# Patient Record
Sex: MALE | Race: WHITE | NOT HISPANIC OR LATINO | Employment: FULL TIME | URBAN - METROPOLITAN AREA
[De-identification: names, ages, dates, MRNs, and addresses within clinical notes are randomized per-mention and may not be internally consistent; named-entity substitution may affect disease eponyms.]

---

## 2018-12-08 ENCOUNTER — HOSPITAL ENCOUNTER (OUTPATIENT)
Facility: HOSPITAL | Age: 28
Setting detail: OBSERVATION
Discharge: LEFT BEFORE DISCHARGE | End: 2018-12-09
Attending: EMERGENCY MEDICINE | Admitting: EMERGENCY MEDICINE

## 2018-12-08 ENCOUNTER — APPOINTMENT (OUTPATIENT)
Dept: CT IMAGING | Facility: HOSPITAL | Age: 28
End: 2018-12-08

## 2018-12-08 DIAGNOSIS — B99.9 FEVER DUE TO INFECTION: ICD-10-CM

## 2018-12-08 DIAGNOSIS — K61.1 PERIRECTAL ABSCESS: Primary | ICD-10-CM

## 2018-12-08 DIAGNOSIS — A41.9 SEPSIS, DUE TO UNSPECIFIED ORGANISM: ICD-10-CM

## 2018-12-08 LAB
ALBUMIN SERPL-MCNC: 4 G/DL (ref 3.5–5.2)
ALBUMIN/GLOB SERPL: 1.8 G/DL
ALP SERPL-CCNC: 63 U/L (ref 39–117)
ALT SERPL W P-5'-P-CCNC: 13 U/L (ref 1–41)
ANION GAP SERPL CALCULATED.3IONS-SCNC: 9.9 MMOL/L
AST SERPL-CCNC: 13 U/L (ref 1–40)
BASOPHILS # BLD AUTO: 0.05 10*3/MM3 (ref 0–0.2)
BASOPHILS NFR BLD AUTO: 0.3 % (ref 0–1.5)
BILIRUB SERPL-MCNC: 0.3 MG/DL (ref 0.1–1.2)
BUN BLD-MCNC: 5 MG/DL (ref 6–20)
BUN/CREAT SERPL: 7.4 (ref 7–25)
CALCIUM SPEC-SCNC: 9 MG/DL (ref 8.6–10.5)
CHLORIDE SERPL-SCNC: 102 MMOL/L (ref 98–107)
CO2 SERPL-SCNC: 28.1 MMOL/L (ref 22–29)
CREAT BLD-MCNC: 0.68 MG/DL (ref 0.76–1.27)
D-LACTATE SERPL-SCNC: 1.2 MMOL/L (ref 0.5–2)
DEPRECATED RDW RBC AUTO: 48 FL (ref 37–54)
EOSINOPHIL # BLD AUTO: 0.18 10*3/MM3 (ref 0–0.7)
EOSINOPHIL NFR BLD AUTO: 1.2 % (ref 0.3–6.2)
ERYTHROCYTE [DISTWIDTH] IN BLOOD BY AUTOMATED COUNT: 13.1 % (ref 11.5–14.5)
GFR SERPL CREATININE-BSD FRML MDRD: 140 ML/MIN/1.73
GLOBULIN UR ELPH-MCNC: 2.2 GM/DL
GLUCOSE BLD-MCNC: 126 MG/DL (ref 65–99)
HCT VFR BLD AUTO: 41.3 % (ref 40.4–52.2)
HGB BLD-MCNC: 13.3 G/DL (ref 13.7–17.6)
HOLD SPECIMEN: NORMAL
HOLD SPECIMEN: NORMAL
IMM GRANULOCYTES # BLD: 0.03 10*3/MM3 (ref 0–0.03)
IMM GRANULOCYTES NFR BLD: 0.2 % (ref 0–0.5)
LIPASE SERPL-CCNC: 13 U/L (ref 13–60)
LYMPHOCYTES # BLD AUTO: 2.41 10*3/MM3 (ref 0.9–4.8)
LYMPHOCYTES NFR BLD AUTO: 16.3 % (ref 19.6–45.3)
MCH RBC QN AUTO: 32 PG (ref 27–32.7)
MCHC RBC AUTO-ENTMCNC: 32.2 G/DL (ref 32.6–36.4)
MCV RBC AUTO: 99.3 FL (ref 79.8–96.2)
MONOCYTES # BLD AUTO: 1.29 10*3/MM3 (ref 0.2–1.2)
MONOCYTES NFR BLD AUTO: 8.7 % (ref 5–12)
NEUTROPHILS # BLD AUTO: 10.84 10*3/MM3 (ref 1.9–8.1)
NEUTROPHILS NFR BLD AUTO: 73.3 % (ref 42.7–76)
PLATELET # BLD AUTO: 161 10*3/MM3 (ref 140–500)
PMV BLD AUTO: 11.8 FL (ref 6–12)
POTASSIUM BLD-SCNC: 3.7 MMOL/L (ref 3.5–5.2)
PROT SERPL-MCNC: 6.2 G/DL (ref 6–8.5)
RBC # BLD AUTO: 4.16 10*6/MM3 (ref 4.6–6)
SODIUM BLD-SCNC: 140 MMOL/L (ref 136–145)
WBC NRBC COR # BLD: 14.8 10*3/MM3 (ref 4.5–10.7)
WHOLE BLOOD HOLD SPECIMEN: NORMAL
WHOLE BLOOD HOLD SPECIMEN: NORMAL

## 2018-12-08 PROCEDURE — 25010000002 METHYLPREDNISOLONE PER 125 MG: Performed by: EMERGENCY MEDICINE

## 2018-12-08 PROCEDURE — 25010000002 ONDANSETRON PER 1 MG: Performed by: EMERGENCY MEDICINE

## 2018-12-08 PROCEDURE — 96375 TX/PRO/DX INJ NEW DRUG ADDON: CPT

## 2018-12-08 PROCEDURE — 87040 BLOOD CULTURE FOR BACTERIA: CPT | Performed by: EMERGENCY MEDICINE

## 2018-12-08 PROCEDURE — 83690 ASSAY OF LIPASE: CPT | Performed by: EMERGENCY MEDICINE

## 2018-12-08 PROCEDURE — 96361 HYDRATE IV INFUSION ADD-ON: CPT

## 2018-12-08 PROCEDURE — 80053 COMPREHEN METABOLIC PANEL: CPT | Performed by: EMERGENCY MEDICINE

## 2018-12-08 PROCEDURE — 99284 EMERGENCY DEPT VISIT MOD MDM: CPT

## 2018-12-08 PROCEDURE — G0378 HOSPITAL OBSERVATION PER HR: HCPCS

## 2018-12-08 PROCEDURE — 85025 COMPLETE CBC W/AUTO DIFF WBC: CPT | Performed by: EMERGENCY MEDICINE

## 2018-12-08 PROCEDURE — 83605 ASSAY OF LACTIC ACID: CPT | Performed by: EMERGENCY MEDICINE

## 2018-12-08 PROCEDURE — 25010000002 HYDROMORPHONE PER 4 MG: Performed by: EMERGENCY MEDICINE

## 2018-12-08 PROCEDURE — 36415 COLL VENOUS BLD VENIPUNCTURE: CPT

## 2018-12-08 PROCEDURE — 25010000002 DIPHENHYDRAMINE PER 50 MG: Performed by: EMERGENCY MEDICINE

## 2018-12-08 RX ORDER — SODIUM CHLORIDE 0.9 % (FLUSH) 0.9 %
10 SYRINGE (ML) INJECTION AS NEEDED
Status: DISCONTINUED | OUTPATIENT
Start: 2018-12-08 | End: 2018-12-09 | Stop reason: HOSPADM

## 2018-12-08 RX ORDER — DIPHENHYDRAMINE HYDROCHLORIDE 50 MG/ML
25 INJECTION INTRAMUSCULAR; INTRAVENOUS ONCE
Status: COMPLETED | OUTPATIENT
Start: 2018-12-08 | End: 2018-12-08

## 2018-12-08 RX ORDER — ACETAMINOPHEN 500 MG
1000 TABLET ORAL ONCE
Status: DISCONTINUED | OUTPATIENT
Start: 2018-12-08 | End: 2018-12-09 | Stop reason: HOSPADM

## 2018-12-08 RX ORDER — ONDANSETRON 2 MG/ML
4 INJECTION INTRAMUSCULAR; INTRAVENOUS ONCE
Status: COMPLETED | OUTPATIENT
Start: 2018-12-08 | End: 2018-12-08

## 2018-12-08 RX ORDER — METHYLPREDNISOLONE SODIUM SUCCINATE 125 MG/2ML
125 INJECTION, POWDER, LYOPHILIZED, FOR SOLUTION INTRAMUSCULAR; INTRAVENOUS ONCE
Status: COMPLETED | OUTPATIENT
Start: 2018-12-08 | End: 2018-12-08

## 2018-12-08 RX ORDER — HYDROMORPHONE HYDROCHLORIDE 1 MG/ML
0.5 INJECTION, SOLUTION INTRAMUSCULAR; INTRAVENOUS; SUBCUTANEOUS ONCE
Status: COMPLETED | OUTPATIENT
Start: 2018-12-08 | End: 2018-12-08

## 2018-12-08 RX ADMIN — HYDROMORPHONE HYDROCHLORIDE 0.5 MG: 1 INJECTION, SOLUTION INTRAMUSCULAR; INTRAVENOUS; SUBCUTANEOUS at 22:48

## 2018-12-08 RX ADMIN — METHYLPREDNISOLONE SODIUM SUCCINATE 125 MG: 125 INJECTION, POWDER, FOR SOLUTION INTRAMUSCULAR; INTRAVENOUS at 23:57

## 2018-12-08 RX ADMIN — ONDANSETRON 4 MG: 2 INJECTION INTRAMUSCULAR; INTRAVENOUS at 22:49

## 2018-12-08 RX ADMIN — DIPHENHYDRAMINE HYDROCHLORIDE 25 MG: 50 INJECTION, SOLUTION INTRAMUSCULAR; INTRAVENOUS at 23:57

## 2018-12-08 RX ADMIN — SODIUM CHLORIDE, POTASSIUM CHLORIDE, SODIUM LACTATE AND CALCIUM CHLORIDE 1000 ML: 600; 310; 30; 20 INJECTION, SOLUTION INTRAVENOUS at 22:45

## 2018-12-09 ENCOUNTER — APPOINTMENT (OUTPATIENT)
Dept: CT IMAGING | Facility: HOSPITAL | Age: 28
End: 2018-12-09

## 2018-12-09 VITALS
RESPIRATION RATE: 18 BRPM | HEIGHT: 75 IN | OXYGEN SATURATION: 96 % | DIASTOLIC BLOOD PRESSURE: 72 MMHG | SYSTOLIC BLOOD PRESSURE: 122 MMHG | TEMPERATURE: 98.6 F | BODY MASS INDEX: 23.08 KG/M2 | WEIGHT: 185.6 LBS | HEART RATE: 82 BPM

## 2018-12-09 PROBLEM — K61.1 PERIRECTAL ABSCESS: Status: ACTIVE | Noted: 2018-12-09

## 2018-12-09 LAB
BACTERIA UR QL AUTO: ABNORMAL /HPF
BILIRUB UR QL STRIP: NEGATIVE
CLARITY UR: CLEAR
COLOR UR: YELLOW
GLUCOSE UR STRIP-MCNC: NEGATIVE MG/DL
HGB UR QL STRIP.AUTO: ABNORMAL
HYALINE CASTS UR QL AUTO: ABNORMAL /LPF
KETONES UR QL STRIP: NEGATIVE
LEUKOCYTE ESTERASE UR QL STRIP.AUTO: NEGATIVE
NITRITE UR QL STRIP: NEGATIVE
PH UR STRIP.AUTO: >=9 [PH] (ref 5–8)
PROT UR QL STRIP: NEGATIVE
RBC # UR: ABNORMAL /HPF
REF LAB TEST METHOD: ABNORMAL
SP GR UR STRIP: 1.01 (ref 1–1.03)
SQUAMOUS #/AREA URNS HPF: ABNORMAL /HPF
UROBILINOGEN UR QL STRIP: ABNORMAL
WBC UR QL AUTO: ABNORMAL /HPF

## 2018-12-09 PROCEDURE — 25010000002 PIPERACILLIN SOD-TAZOBACTAM PER 1 G: Performed by: EMERGENCY MEDICINE

## 2018-12-09 PROCEDURE — 96376 TX/PRO/DX INJ SAME DRUG ADON: CPT

## 2018-12-09 PROCEDURE — 25010000002 IOPAMIDOL 61 % SOLUTION: Performed by: EMERGENCY MEDICINE

## 2018-12-09 PROCEDURE — 25010000002 PIPERACILLIN SOD-TAZOBACTAM PER 1 G: Performed by: SURGERY

## 2018-12-09 PROCEDURE — 74177 CT ABD & PELVIS W/CONTRAST: CPT

## 2018-12-09 PROCEDURE — G0378 HOSPITAL OBSERVATION PER HR: HCPCS

## 2018-12-09 PROCEDURE — 96366 THER/PROPH/DIAG IV INF ADDON: CPT

## 2018-12-09 PROCEDURE — 81001 URINALYSIS AUTO W/SCOPE: CPT | Performed by: EMERGENCY MEDICINE

## 2018-12-09 PROCEDURE — 96361 HYDRATE IV INFUSION ADD-ON: CPT

## 2018-12-09 PROCEDURE — 96365 THER/PROPH/DIAG IV INF INIT: CPT

## 2018-12-09 PROCEDURE — 87040 BLOOD CULTURE FOR BACTERIA: CPT | Performed by: EMERGENCY MEDICINE

## 2018-12-09 PROCEDURE — 25010000002 HYDROMORPHONE PER 4 MG: Performed by: EMERGENCY MEDICINE

## 2018-12-09 PROCEDURE — 25010000002 HYDROMORPHONE PER 4 MG: Performed by: SURGERY

## 2018-12-09 PROCEDURE — 99218 PR INITIAL OBSERVATION CARE/DAY 30 MINUTES: CPT | Performed by: SURGERY

## 2018-12-09 RX ORDER — HYDROMORPHONE HYDROCHLORIDE 1 MG/ML
0.5 INJECTION, SOLUTION INTRAMUSCULAR; INTRAVENOUS; SUBCUTANEOUS
Status: DISCONTINUED | OUTPATIENT
Start: 2018-12-09 | End: 2018-12-09 | Stop reason: HOSPADM

## 2018-12-09 RX ORDER — SODIUM CHLORIDE, SODIUM LACTATE, POTASSIUM CHLORIDE, CALCIUM CHLORIDE 600; 310; 30; 20 MG/100ML; MG/100ML; MG/100ML; MG/100ML
100 INJECTION, SOLUTION INTRAVENOUS CONTINUOUS
Status: DISCONTINUED | OUTPATIENT
Start: 2018-12-09 | End: 2018-12-09 | Stop reason: HOSPADM

## 2018-12-09 RX ORDER — ONDANSETRON 2 MG/ML
4 INJECTION INTRAMUSCULAR; INTRAVENOUS EVERY 6 HOURS PRN
Status: DISCONTINUED | OUTPATIENT
Start: 2018-12-09 | End: 2018-12-09 | Stop reason: HOSPADM

## 2018-12-09 RX ORDER — HYDROMORPHONE HYDROCHLORIDE 1 MG/ML
0.5 INJECTION, SOLUTION INTRAMUSCULAR; INTRAVENOUS; SUBCUTANEOUS ONCE
Status: COMPLETED | OUTPATIENT
Start: 2018-12-09 | End: 2018-12-09

## 2018-12-09 RX ADMIN — HYDROMORPHONE HYDROCHLORIDE 0.5 MG: 1 INJECTION, SOLUTION INTRAMUSCULAR; INTRAVENOUS; SUBCUTANEOUS at 01:52

## 2018-12-09 RX ADMIN — SODIUM CHLORIDE, POTASSIUM CHLORIDE, SODIUM LACTATE AND CALCIUM CHLORIDE 100 ML/HR: 600; 310; 30; 20 INJECTION, SOLUTION INTRAVENOUS at 03:28

## 2018-12-09 RX ADMIN — TAZOBACTAM SODIUM AND PIPERACILLIN SODIUM 3.38 G: 375; 3 INJECTION, SOLUTION INTRAVENOUS at 09:05

## 2018-12-09 RX ADMIN — IOPAMIDOL 85 ML: 612 INJECTION, SOLUTION INTRAVENOUS at 00:58

## 2018-12-09 RX ADMIN — HYDROMORPHONE HYDROCHLORIDE 0.5 MG: 1 INJECTION, SOLUTION INTRAMUSCULAR; INTRAVENOUS; SUBCUTANEOUS at 03:28

## 2018-12-09 RX ADMIN — TAZOBACTAM SODIUM AND PIPERACILLIN SODIUM 3.38 G: 375; 3 INJECTION, SOLUTION INTRAVENOUS at 01:44

## 2018-12-09 RX ADMIN — HYDROMORPHONE HYDROCHLORIDE 0.5 MG: 1 INJECTION, SOLUTION INTRAMUSCULAR; INTRAVENOUS; SUBCUTANEOUS at 09:05

## 2018-12-09 NOTE — ED PROVIDER NOTES
EMERGENCY DEPARTMENT ENCOUNTER    CHIEF COMPLAINT  Chief Complaint: Constipation  History given by: Pt  History limited by: None  Room Number: 19/19  PMD: Provider, No Known      HPI:  Pt is a 28 y.o. male who presents complaining of worsening constipation, rectal pain, and lower abd pain that began 3 days ago. Pt states his last BM was 1 week ago. Pt states he feel the pressure in his rectum but is unable to pass any stool. Pt states he is passing gas but with a lot of rectal pressure. Pt has a fever upon arrival to ED. Pt denies nausea, vomiting, cough, sore throat, and urinary sx.  Pt has tried stool softer and laxatives without relief. Pt has h/o appendectomy.     Duration:  1 week  Onset: gradual  Timing: constant  Location: rectal  Quality: feels stool on rectum but cannot pass it   Intensity/Severity: moderate  Progression: worsening   Associated Symptoms: rectal pain, lower abd pain, fever  Aggravating Factors: none  Alleviating Factors: none  Previous Episodes: none stated   Treatment before arrival: Pt has tried stool softer and laxatives without relief.    PAST MEDICAL HISTORY  Active Ambulatory Problems     Diagnosis Date Noted   • No Active Ambulatory Problems     Resolved Ambulatory Problems     Diagnosis Date Noted   • No Resolved Ambulatory Problems     No Additional Past Medical History       PAST SURGICAL HISTORY  Past Surgical History:   Procedure Laterality Date   • ADENOIDECTOMY     • APPENDECTOMY     • CLEFT LIP REPAIR     • CLEFT PALATE REPAIR     • TONSILLECTOMY         FAMILY HISTORY  History reviewed. No pertinent family history.    SOCIAL HISTORY  Social History     Socioeconomic History   • Marital status:      Spouse name: Not on file   • Number of children: Not on file   • Years of education: Not on file   • Highest education level: Not on file   Social Needs   • Financial resource strain: Not on file   • Food insecurity - worry: Not on file   • Food insecurity - inability: Not  on file   • Transportation needs - medical: Not on file   • Transportation needs - non-medical: Not on file   Occupational History   • Not on file   Tobacco Use   • Smoking status: Current Every Day Smoker     Packs/day: 0.50     Types: Cigarettes   Substance and Sexual Activity   • Alcohol use: Yes     Frequency: Never     Comment: occ   • Drug use: No   • Sexual activity: Defer   Other Topics Concern   • Not on file   Social History Narrative   • Not on file       ALLERGIES  Nsaids and Latex    REVIEW OF SYSTEMS  Review of Systems   Constitutional: Positive for fever. Negative for activity change and appetite change.   HENT: Negative for congestion and sore throat.    Eyes: Negative.    Respiratory: Negative for cough and shortness of breath.    Cardiovascular: Negative for chest pain and leg swelling.   Gastrointestinal: Positive for abdominal pain (lower), constipation and rectal pain. Negative for diarrhea, nausea and vomiting.   Endocrine: Negative.    Genitourinary: Negative for decreased urine volume and dysuria.   Musculoskeletal: Negative for neck pain.   Skin: Negative for rash and wound.   Allergic/Immunologic: Negative.    Neurological: Negative for weakness, numbness and headaches.   Hematological: Negative.    Psychiatric/Behavioral: Negative.    All other systems reviewed and are negative.      PHYSICAL EXAM  ED Triage Vitals   Temp Heart Rate Resp BP SpO2   12/08/18 2047 12/08/18 2047 12/08/18 2047 12/08/18 2053 12/08/18 2047   (!) 101.6 °F (38.7 °C) (!) 128 20 135/74 96 %      Temp src Heart Rate Source Patient Position BP Location FiO2 (%)   12/08/18 2047 12/08/18 2047 -- -- --   Tympanic Monitor          Physical Exam   Constitutional: He is oriented to person, place, and time. No distress.   HENT:   Head: Normocephalic and atraumatic.   Mouth/Throat: Mucous membranes are dry.   Eyes: EOM are normal. Pupils are equal, round, and reactive to light.   Neck: Normal range of motion. Neck supple.    Cardiovascular: Regular rhythm and normal heart sounds. Tachycardia present.   Pulmonary/Chest: Effort normal and breath sounds normal. No respiratory distress.   Abdominal: Soft. There is tenderness (moderate diffuse). There is no rebound and no guarding.   Genitourinary:   Genitourinary Comments: Rectal exam-mild tenderness just superior to anus, no fluctuance, no erythema, pt would not tolerate digital exam    Musculoskeletal: Normal range of motion. He exhibits no edema.   Neurological: He is alert and oriented to person, place, and time. He has normal sensation and normal strength.   Skin: Skin is warm and dry.   Psychiatric: Mood and affect normal.   Nursing note and vitals reviewed.      LAB RESULTS  Lab Results (last 24 hours)     Procedure Component Value Units Date/Time    CBC & Differential [[id number]] Collected:  12/08/18 2125    Specimen:  Blood Updated:  12/08/18 2208    Narrative:       The following orders were created for panel order CBC & Differential.  Procedure                               Abnormality         Status                     ---------                               -----------         ------                     CBC Auto Differential[[id number]]         Abnormal            Final result                 Please view results for these tests on the individual orders.    Comprehensive Metabolic Panel [[id number]]  (Abnormal) Collected:  12/08/18 2125    Specimen:  Blood Updated:  12/08/18 2159     Glucose 126 mg/dL      BUN 5 mg/dL      Creatinine 0.68 mg/dL      Sodium 140 mmol/L      Potassium 3.7 mmol/L      Chloride 102 mmol/L      CO2 28.1 mmol/L      Calcium 9.0 mg/dL      Total Protein 6.2 g/dL      Albumin 4.00 g/dL      ALT (SGPT) 13 U/L      AST (SGOT) 13 U/L      Alkaline Phosphatase 63 U/L      Total Bilirubin 0.3 mg/dL      eGFR Non African Amer 140 mL/min/1.73      Globulin 2.2 gm/dL      A/G Ratio 1.8 g/dL      BUN/Creatinine Ratio 7.4     Anion Gap 9.9 mmol/L     Lipase  [[id number]]  (Normal) Collected:  12/08/18 2125    Specimen:  Blood Updated:  12/08/18 2159     Lipase 13 U/L     CBC Auto Differential [[id number]]  (Abnormal) Collected:  12/08/18 2125    Specimen:  Blood Updated:  12/08/18 2208     WBC 14.80 10*3/mm3      RBC 4.16 10*6/mm3      Hemoglobin 13.3 g/dL      Hematocrit 41.3 %      MCV 99.3 fL      MCH 32.0 pg      MCHC 32.2 g/dL      RDW 13.1 %      RDW-SD 48.0 fl      MPV 11.8 fL      Platelets 161 10*3/mm3      Neutrophil % 73.3 %      Lymphocyte % 16.3 %      Monocyte % 8.7 %      Eosinophil % 1.2 %      Basophil % 0.3 %      Immature Grans % 0.2 %      Neutrophils, Absolute 10.84 10*3/mm3      Lymphocytes, Absolute 2.41 10*3/mm3      Monocytes, Absolute 1.29 10*3/mm3      Eosinophils, Absolute 0.18 10*3/mm3      Basophils, Absolute 0.05 10*3/mm3      Immature Grans, Absolute 0.03 10*3/mm3     Lactic Acid, Plasma [[id number]]  (Normal) Collected:  12/08/18 2125    Specimen:  Blood Updated:  12/08/18 2149     Lactate 1.2 mmol/L     Blood Culture - Blood, Arm, Left [[id number]] Collected:  12/08/18 2126    Specimen:  Blood from Arm, Left Updated:  12/08/18 2132    Urinalysis With Microscopic If Indicated (No Culture) - Urine, Clean Catch [[id number]]  (Abnormal) Collected:  12/09/18 0001    Specimen:  Urine, Clean Catch Updated:  12/09/18 0024     Color, UA Yellow     Appearance, UA Clear     pH, UA >=9.0     Specific Gravity, UA 1.010     Glucose, UA Negative     Ketones, UA Negative     Bilirubin, UA Negative     Blood, UA Trace     Protein, UA Negative     Leuk Esterase, UA Negative     Nitrite, UA Negative     Urobilinogen, UA 0.2 E.U./dL    Urinalysis, Microscopic Only - Urine, Clean Catch [[id number]]  (Abnormal) Collected:  12/09/18 0001    Specimen:  Urine, Clean Catch Updated:  12/09/18 0024     RBC, UA 3-5 /HPF      WBC, UA 0-2 /HPF      Bacteria, UA None Seen /HPF      Squamous Epithelial Cells, UA 0-2 /HPF      Hyaline Casts, UA 0-2 /LPF      Methodology Automated  Microscopy          I ordered the above labs and reviewed the results    RADIOLOGY  CT Abdomen Pelvis With Contrast   Final Result   IMPRESSION :    1. 19 mm perirectal/perianal complex fluid collection suspicious for   very early abscess. Recommend follow-up           This report was finalized on 12/9/2018 1:20 AM by Jignesh Montes M.D.               I ordered the above noted radiological studies. Interpreted by radiologist. Reviewed by me in PACS.       PROCEDURES  Critical Care  Performed by: Stephon Christopher MD  Authorized by: Stephon Christopher MD     Critical care provider statement:     Critical care time (minutes):  30    Critical care time was exclusive of:  Separately billable procedures and treating other patients    Critical care was necessary to treat or prevent imminent or life-threatening deterioration of the following conditions:  Sepsis    Critical care was time spent personally by me on the following activities:  Development of treatment plan with patient or surrogate, discussions with consultants, evaluation of patient's response to treatment, examination of patient, obtaining history from patient or surrogate, ordering and performing treatments and interventions, ordering and review of laboratory studies, ordering and review of radiographic studies, pulse oximetry, re-evaluation of patient's condition and review of old charts            PROGRESS AND CONSULTS   10:14 PM  CT abd pelvis and UA ordered for evaluaion. Tylenol, Zofran, Dilaudid, and IV fluids ordered.    11:25 PM  Solumedrol and Benadryl ordered to prevent IV contrast reaction.     1:25 AM  Zosyn ordered to cover infection. Consult placed to surgery.     1:27 AM  Rechecked pt who is resting in NAD. Informed pt of abscess seen on CT.   Discussed pt case with Dr. Littlejohn, general surgery, who agrees to admit.     1:30 AM  Informed pt of plan to admit. Pt understands and agrees with the plan, all questions answered.        MEDICAL  DECISION MAKING  Results were reviewed/discussed with the patient and they were also made aware of online access. Pt also made aware that some labs, such as cultures, will not be resulted during ER visit and follow up with PMD is necessary.     MDM  Number of Diagnoses or Management Options  Fever due to infection:   Perirectal abscess:   Sepsis, due to unspecified organism (CMS/HCC):   Diagnosis management comments: CT scan showed a perirectal abscess.  Patient was septic with a fever and elevated white blood cell count.  He was given IV Zosyn, IV fluids, IV Dilaudid, and IV Zofran.  He reported that after having IV contrast in the past, he developed itching.  Therefore, he was given IV Benadryl and IV Solu-Medrol prior to having his CT scan done today.  He did not develop any complications from the IV contrast today.  Case was discussed with Dr. Littlejohn and he agreed to admit the patient.  Critical care was performed on this patient.       Amount and/or Complexity of Data Reviewed  Clinical lab tests: ordered and reviewed (WBC-14.8  Urinalysis- RBC 3-5, WBC 0-2, Bacteria none seen)  Tests in the radiology section of CPT®: ordered and reviewed (CT abd pelvis-19mm perirectal/perianla fluid collection suspicious of early abscess)  Decide to obtain previous medical records or to obtain history from someone other than the patient: yes  Review and summarize past medical records: yes    Critical Care  Total time providing critical care: 30-74 minutes         DIAGNOSIS  Final diagnoses:   Perirectal abscess   Fever due to infection   Sepsis, due to unspecified organism (CMS/HCC)       DISPOSITION  ADMISSION    Discussed treatment plan and reason for admission with pt/family and admitting physician.  Pt/family voiced understanding of the plan for admission for further testing/treatment as needed.       Latest Documented Vital Signs:  As of 1:40 AM  BP- 100/65 HR- 90 Temp- 98.2 °F (36.8 °C) (Oral) O2 sat-  97%    --  Documentation assistance provided by carlos a Hurd for Dr. Christopher.  Information recorded by the scribe was done at my direction and has been verified and validated by me.       Brenda Hurd  12/09/18 0133       Stephon Christopher MD  12/09/18 0141

## 2018-12-09 NOTE — NURSING NOTE
MT called RN at 1041, that pts wife had come out asking about nicotine patch all night. RN called, pre-op in regards to pts allowed to wear nicotine patch in OR, then called surgery desk who told RN pt was scheduled for 4pm, would be taken down at 3pm. Wife at bedside stating pt has been asking for 12 hrs for nicotine patch, told pt and wife that was unsure if pt could wear patch in OR. Pt stated he would need to go down and smoke, RN told pt he was not allowed to leave the floor and would be d/c'ed if he left. RN tried to page Dr. Littlejohn, Dr. Juarez on call and was currently on unit. Spoke with Dr. Juarez regarding pt wanting nicotine patch, said to address after sx. Told pt and wife, who demanded to speak with doctor. RN told Dr. Juarez what pts family had said. RN called pre-op and spoke with Khloe who spoke with Dr. Littlejohn and okayed nicotine patch. RN told pt and wife, said would be around 15 minutes as this RN would go to pharmacy to  nicotine patch. Pt stated this would not be soon enough. Wife appeared very frustrated, asked to have pt d/c and records sent to [place]. RN educated that leaving would be considered going against medical advice. Call placed to Khloe in pre-op, to update pt AMA status and said she would let Dr. Littlejohn know. Dr. Littlejohn called unit, RN discussed situation. RN told pt that insurance would not necessarily pay for stay and would have to go through [facility]. Dr. Littlejohn also came to unit to discuss with pt. IV removed, heart monitor removed, pt left unit,  updated on situation.

## 2018-12-09 NOTE — H&P
Patient Care Team:  Provider, No Known as PCP - General    Chief complaint:  Perianal pain    Subjective     History of Present Illness     The patient is a very pleasant 28-year-old male who presented to the emergency room with a several day history of increasing perianal pain.  He has developed severe pain in the perianal region to the point that he cannot sit.  Any movement causes the pain.  A CT scan of the abdomen and pelvis was performed that shows a perirectal abscess.    Review of Systems   Constitutional: Negative for activity change, appetite change, fatigue and fever.   HENT: Negative for trouble swallowing and voice change.    Respiratory: Negative for chest tightness and shortness of breath.    Cardiovascular: Negative for chest pain and palpitations.   Gastrointestinal: Negative for abdominal pain, blood in stool, constipation, diarrhea, nausea and vomiting.   Endocrine: Negative for cold intolerance and heat intolerance.   Genitourinary: Negative for dysuria and flank pain.   Neurological: Negative for dizziness and light-headedness.   Hematological: Negative for adenopathy. Does not bruise/bleed easily.   Psychiatric/Behavioral: Negative for agitation and confusion.        History reviewed. No pertinent past medical history.  Past Surgical History:   Procedure Laterality Date   • ADENOIDECTOMY     • APPENDECTOMY     • CLEFT LIP REPAIR     • CLEFT PALATE REPAIR     • TONSILLECTOMY       History reviewed. No pertinent family history.  Social History     Tobacco Use   • Smoking status: Current Every Day Smoker     Packs/day: 0.50     Types: Cigarettes   Substance Use Topics   • Alcohol use: Yes     Frequency: Never     Comment: occ   • Drug use: No     Allergies:  Nsaids and Latex    Objective      Vital Signs  Temp:  [98.2 °F (36.8 °C)-101.6 °F (38.7 °C)] 98.6 °F (37 °C)  Heart Rate:  [] 82  Resp:  [17-24] 18  BP: (100-135)/(62-74) 122/72    Physical Exam   Constitutional: He is oriented to  person, place, and time. He appears well-developed and well-nourished.  Non-toxic appearance.   Eyes: EOM are normal. No scleral icterus.   Pulmonary/Chest: Effort normal. No respiratory distress.   Abdominal: Normal appearance.   Genitourinary:   Genitourinary Comments: Rectal: Normal sphincter tone, posteriorly positioned perirectal abscess that is very tender to palpation.   Neurological: He is alert and oriented to person, place, and time.   Skin: Skin is warm and dry.   Psychiatric: He has a normal mood and affect. His behavior is normal. Judgment and thought content normal.       Results Review:   I reviewed the patient's new clinical results.      Assessment/Plan       Perirectal abscess      Assessment & Plan     1.  Perirectal abscess: Plan incision and drainage of a perirectal abscess.  The patient understands the indications, alternatives, risks, and benefits of the procedure and wishes to proceed.    I discussed the patients findings and my recommendations with patient    Kevin Littlejohn Jr., MD  12/09/18  9:07 AM

## 2018-12-09 NOTE — PROGRESS NOTES
The patient is very upset and is now leaving gets medical advice.    I spoke to him at the bedside as he was getting dressed and getting his IV removed.  He states that he has had a terrible experience because he has not received a nicotine patch quick enough and he is angry that his case has been delayed by a surgical emergency.  He states that he is going to a [facility].  He was advised that he is already on the surgery schedule and he will likely have surgery sooner if he stays than if he goes to a [facility] and has to go through the emergency room.  He reiterated is frustration regarding the delay in the nicotine patch and is demanding to leave.

## 2018-12-09 NOTE — ED NOTES
Pt reports he feels like his bladder is extremely full & he is unable to pee     Macey Doran, ABRAHAM  12/08/18 4926

## 2018-12-09 NOTE — ED NOTES
Pt appears in distress. Pt reports that he thinks he has impacted bowels for the a week. Pt states about 3 days ago starting to have pain. Pt reports taking three different laxatives with no improvement. Pt is pale and appears weak on feet. Pt helped into WC upon arrival.     Chel Jacob RN  12/08/18 2047

## 2018-12-09 NOTE — ED NOTES
Nursing report ED to floor  Stephon Jauregui  28 y.o.  male    HPI (triage note):   Chief Complaint   Patient presents with   • Constipation       Admitting doctor:   Kevin Littlejohn Jr., MD    Admitting diagnosis:   The primary encounter diagnosis was Perirectal abscess. Diagnoses of Fever due to infection and Sepsis, due to unspecified organism (CMS/HCC) were also pertinent to this visit.    Code status:   Current Code Status     This patient does not have a recorded code status. Please follow your organizational policy for patients in this situation.          Allergies:   Nsaids and Latex    Weight:       12/08/18  2241   Weight: 88.4 kg (194 lb 12.8 oz)       Most recent vitals:   Vitals:    12/08/18 2241 12/08/18 2255 12/08/18 2347 12/09/18 0004   BP: 106/62 121/65 100/65    BP Location: Left arm      Patient Position: Lying      Pulse: 90      Resp: 24   17   Temp: 98.2 °F (36.8 °C)      TempSrc: Oral      SpO2: 99% 98% 97%    Weight: 88.4 kg (194 lb 12.8 oz)      Height:           Active LDAs/IV Access:   Lines, Drains & Airways    Active LDAs     Name:   Placement date:   Placement time:   Site:   Days:    Peripheral IV 12/08/18 2126 Right Antecubital   12/08/18 2126    Antecubital   less than 1                Labs (abnormal labs have a star):   Labs Reviewed   COMPREHENSIVE METABOLIC PANEL - Abnormal; Notable for the following components:       Result Value    Glucose 126 (*)     BUN 5 (*)     Creatinine 0.68 (*)     All other components within normal limits   CBC WITH AUTO DIFFERENTIAL - Abnormal; Notable for the following components:    WBC 14.80 (*)     RBC 4.16 (*)     Hemoglobin 13.3 (*)     MCV 99.3 (*)     MCHC 32.2 (*)     Lymphocyte % 16.3 (*)     Neutrophils, Absolute 10.84 (*)     Monocytes, Absolute 1.29 (*)     All other components within normal limits   URINALYSIS W/ MICROSCOPIC IF INDICATED (NO CULTURE) - Abnormal; Notable for the following components:    pH, UA >=9.0 (*)     Blood, UA Trace  (*)     All other components within normal limits   URINALYSIS, MICROSCOPIC ONLY - Abnormal; Notable for the following components:    RBC, UA 3-5 (*)     All other components within normal limits   LIPASE - Normal   LACTIC ACID, PLASMA - Normal   BLOOD CULTURE   BLOOD CULTURE   RAINBOW DRAW    Narrative:     The following orders were created for panel order [place] Draw.  Procedure                               Abnormality         Status                     ---------                               -----------         ------                     Light Blue Top[[id number]]                                    Final result               Green Top (Gel)[[id number]]                                   Final result               Lavender Top[[id number]]                                      Final result               Gold Top - SST[[id number]]                                    Final result                 Please view results for these tests on the individual orders.   CBC AND DIFFERENTIAL    Narrative:     The following orders were created for panel order CBC & Differential.  Procedure                               Abnormality         Status                     ---------                               -----------         ------                     CBC Auto Differential[[id number]]         Abnormal            Final result                 Please view results for these tests on the individual orders.   LIGHT BLUE TOP   GREEN TOP   LAVENDER TOP   GOLD TOP - SST       EKG:   No orders to display       Meds given in ED:   Medications   sodium chloride 0.9 % flush 10 mL (not administered)   acetaminophen (TYLENOL) tablet 1,000 mg (0 mg Oral Hold 12/8/18 2258)   piperacillin-tazobactam (ZOSYN) 3.375 g in iso-osmotic dextrose 50 ml (premix) (3.375 g Intravenous New Bag 12/9/18 0144)   lactated ringers bolus 1,000 mL (0 mL Intravenous Stopped 12/8/18 2345)   HYDROmorphone (DILAUDID) injection 0.5 mg (0.5 mg Intravenous Given 12/8/18 2248)    ondansetron (ZOFRAN) injection 4 mg (4 mg Intravenous Given 12/8/18 9029)   diphenhydrAMINE (BENADRYL) injection 25 mg (25 mg Intravenous Given 12/8/18 8617)   methylPREDNISolone sodium succinate (SOLU-Medrol) injection 125 mg (125 mg Intravenous Given 12/8/18 5337)   iopamidol (ISOVUE-300) 61 % injection 100 mL (85 mL Intravenous Given 12/9/18 6188)   HYDROmorphone (DILAUDID) injection 0.5 mg (0.5 mg Intravenous Given 12/9/18 5862)       Imaging results:  Ct Abdomen Pelvis With Contrast    Result Date: 12/9/2018  IMPRESSION : 1. 19 mm perirectal/perianal complex fluid collection suspicious for very early abscess. Recommend follow-up   This report was finalized on 12/9/2018 1:20 AM by Jignesh Montes M.D.        Ambulatory status:   -   Social issues:   Social History     Socioeconomic History   • Marital status:      Spouse name: Not on file   • Number of children: Not on file   • Years of education: Not on file   • Highest education level: Not on file   Social Needs   • Financial resource strain: Not on file   • Food insecurity - worry: Not on file   • Food insecurity - inability: Not on file   • Transportation needs - medical: Not on file   • Transportation needs - non-medical: Not on file   Occupational History   • Not on file   Tobacco Use   • Smoking status: Current Every Day Smoker     Packs/day: 0.50     Types: Cigarettes   Substance and Sexual Activity   • Alcohol use: Yes     Frequency: Never     Comment: occ   • Drug use: No   • Sexual activity: Defer   Other Topics Concern   • Not on file   Social History Narrative   • Not on file          Bisig, Macey, RN  12/09/18 0158

## 2018-12-09 NOTE — PLAN OF CARE
Problem: Patient Care Overview  Goal: Plan of Care Review  Outcome: Ongoing (interventions implemented as appropriate)   12/09/18 0338   Coping/Psychosocial   Plan of Care Reviewed With patient   Plan of Care Review   Progress no change   OTHER   Outcome Summary Admitted for perirectal abcess. C/o severe pain. Npo in preparation of possible surgery. Resting comfortably after prn dilaudid administered. IV abx ordered. VSS. Will continue to monitor.     Goal: Individualization and Mutuality  Outcome: Ongoing (interventions implemented as appropriate)    Goal: Discharge Needs Assessment  Outcome: Ongoing (interventions implemented as appropriate)      Problem: Infection, Risk/Actual (Adult)  Goal: Identify Related Risk Factors and Signs and Symptoms  Outcome: Ongoing (interventions implemented as appropriate)    Goal: Infection Prevention/Resolution  Outcome: Ongoing (interventions implemented as appropriate)      Problem: Pain, Acute (Adult)  Goal: Identify Related Risk Factors and Signs and Symptoms  Outcome: Ongoing (interventions implemented as appropriate)    Goal: Acceptable Pain Control/Comfort Level  Outcome: Ongoing (interventions implemented as appropriate)      Problem: Constipation (Adult)  Goal: Identify Related Risk Factors and Signs and Symptoms  Outcome: Ongoing (interventions implemented as appropriate)    Goal: Effective Bowel Elimination  Outcome: Ongoing (interventions implemented as appropriate)    Goal: Comfort  Outcome: Ongoing (interventions implemented as appropriate)

## 2018-12-09 NOTE — ED NOTES
Pt offered straight cath & pt refused at this time. Pt said he wanted to try to stand up and pee, pt given urinal. Will continue to monitor     Macey Doran, ABRAHAM  12/08/18 6450

## 2018-12-10 NOTE — DISCHARGE SUMMARY
Discharge Summary    The patient was admitted for a perirectal abscess but left AMA before definitive management could be performed.  This has been documented in the chart.

## 2018-12-10 NOTE — PROGRESS NOTES
Case Management Discharge Note    Final Note: Patient left AMA. Colleen Sr RN    Destination      No service has been selected for the patient.      Durable Medical Equipment      No service has been selected for the patient.      Dialysis/Infusion      No service has been selected for the patient.      Home Medical Care      No service has been selected for the patient.      Community Resources      No service has been selected for the patient.             Final Discharge Disposition Code: 07 - left AMA

## 2018-12-13 LAB — BACTERIA SPEC AEROBE CULT: NORMAL

## 2018-12-14 LAB — BACTERIA SPEC AEROBE CULT: NORMAL

## 2019-11-20 ENCOUNTER — HOSPITAL ENCOUNTER (EMERGENCY)
Facility: HOSPITAL | Age: 29
Discharge: PSYCHIATRIC OR SUBSTANCE USE FACILITY | End: 2019-11-20
Attending: FAMILY MEDICINE | Admitting: FAMILY MEDICINE

## 2019-11-20 ENCOUNTER — HOSPITAL ENCOUNTER (INPATIENT)
Facility: HOSPITAL | Age: 29
LOS: 4 days | Discharge: HOME | End: 2019-11-24
Attending: PSYCHIATRY & NEUROLOGY | Admitting: PSYCHIATRY & NEUROLOGY

## 2019-11-20 VITALS
OXYGEN SATURATION: 100 % | SYSTOLIC BLOOD PRESSURE: 146 MMHG | DIASTOLIC BLOOD PRESSURE: 78 MMHG | HEART RATE: 72 BPM | BODY MASS INDEX: 21.76 KG/M2 | TEMPERATURE: 98.3 F | HEIGHT: 75 IN | RESPIRATION RATE: 18 BRPM | WEIGHT: 175 LBS

## 2019-11-20 DIAGNOSIS — F11.93 HEROIN WITHDRAWAL (HCC): Primary | ICD-10-CM

## 2019-11-20 PROBLEM — F11.20 OPIATE DEPENDENCE (HCC): Status: ACTIVE | Noted: 2019-11-20

## 2019-11-20 LAB
6-ACETYL MORPHINE: NEGATIVE
ALBUMIN SERPL-MCNC: 4.5 G/DL (ref 3.5–5.2)
ALBUMIN/GLOB SERPL: 1.9 G/DL
ALP SERPL-CCNC: 60 U/L (ref 39–117)
ALT SERPL W P-5'-P-CCNC: 11 U/L (ref 1–41)
AMPHET+METHAMPHET UR QL: NEGATIVE
ANION GAP SERPL CALCULATED.3IONS-SCNC: 9.1 MMOL/L (ref 5–15)
AST SERPL-CCNC: 13 U/L (ref 1–40)
BACTERIA UR QL AUTO: ABNORMAL /HPF
BARBITURATES UR QL SCN: NEGATIVE
BASOPHILS # BLD AUTO: 0.1 10*3/MM3 (ref 0–0.2)
BASOPHILS NFR BLD AUTO: 1.1 % (ref 0–1.5)
BENZODIAZ UR QL SCN: NEGATIVE
BILIRUB SERPL-MCNC: 0.3 MG/DL (ref 0.2–1.2)
BILIRUB UR QL STRIP: NEGATIVE
BUN BLD-MCNC: 9 MG/DL (ref 6–20)
BUN/CREAT SERPL: 12.2 (ref 7–25)
BUPRENORPHINE SERPL-MCNC: NEGATIVE NG/ML
CALCIUM SPEC-SCNC: 9.6 MG/DL (ref 8.6–10.5)
CANNABINOIDS SERPL QL: NEGATIVE
CHLORIDE SERPL-SCNC: 103 MMOL/L (ref 98–107)
CLARITY UR: ABNORMAL
CO2 SERPL-SCNC: 28.9 MMOL/L (ref 22–29)
COCAINE UR QL: NEGATIVE
COLOR UR: YELLOW
CREAT BLD-MCNC: 0.74 MG/DL (ref 0.76–1.27)
DEPRECATED RDW RBC AUTO: 45.1 FL (ref 37–54)
EOSINOPHIL # BLD AUTO: 0.14 10*3/MM3 (ref 0–0.4)
EOSINOPHIL NFR BLD AUTO: 1.5 % (ref 0.3–6.2)
ERYTHROCYTE [DISTWIDTH] IN BLOOD BY AUTOMATED COUNT: 12.4 % (ref 12.3–15.4)
ETHANOL BLD-MCNC: <10 MG/DL (ref 0–10)
ETHANOL UR QL: <0.01 %
GFR SERPL CREATININE-BSD FRML MDRD: 126 ML/MIN/1.73
GLOBULIN UR ELPH-MCNC: 2.4 GM/DL
GLUCOSE BLD-MCNC: 101 MG/DL (ref 65–99)
GLUCOSE UR STRIP-MCNC: NEGATIVE MG/DL
HAV IGM SERPL QL IA: NORMAL
HBV CORE IGM SERPL QL IA: NORMAL
HBV SURFACE AG SERPL QL IA: NORMAL
HCT VFR BLD AUTO: 43.7 % (ref 37.5–51)
HCV AB SER DONR QL: NORMAL
HGB BLD-MCNC: 14.5 G/DL (ref 13–17.7)
HGB UR QL STRIP.AUTO: ABNORMAL
HIV1+2 AB SER QL: NORMAL
HYALINE CASTS UR QL AUTO: ABNORMAL /LPF
IMM GRANULOCYTES # BLD AUTO: 0.03 10*3/MM3 (ref 0–0.05)
IMM GRANULOCYTES NFR BLD AUTO: 0.3 % (ref 0–0.5)
KETONES UR QL STRIP: NEGATIVE
LEUKOCYTE ESTERASE UR QL STRIP.AUTO: ABNORMAL
LYMPHOCYTES # BLD AUTO: 2.7 10*3/MM3 (ref 0.7–3.1)
LYMPHOCYTES NFR BLD AUTO: 28.4 % (ref 19.6–45.3)
MAGNESIUM SERPL-MCNC: 2.2 MG/DL (ref 1.6–2.6)
MCH RBC QN AUTO: 32.6 PG (ref 26.6–33)
MCHC RBC AUTO-ENTMCNC: 33.2 G/DL (ref 31.5–35.7)
MCV RBC AUTO: 98.2 FL (ref 79–97)
METHADONE UR QL SCN: NEGATIVE
MONOCYTES # BLD AUTO: 0.53 10*3/MM3 (ref 0.1–0.9)
MONOCYTES NFR BLD AUTO: 5.6 % (ref 5–12)
NEUTROPHILS # BLD AUTO: 6.01 10*3/MM3 (ref 1.7–7)
NEUTROPHILS NFR BLD AUTO: 63.1 % (ref 42.7–76)
NITRITE UR QL STRIP: NEGATIVE
NRBC BLD AUTO-RTO: 0 /100 WBC (ref 0–0.2)
OPIATES UR QL: POSITIVE
OXYCODONE UR QL SCN: NEGATIVE
PCP UR QL SCN: NEGATIVE
PH UR STRIP.AUTO: 8 [PH] (ref 5–8)
PLATELET # BLD AUTO: 152 10*3/MM3 (ref 140–450)
PMV BLD AUTO: 11.8 FL (ref 6–12)
POTASSIUM BLD-SCNC: 4.4 MMOL/L (ref 3.5–5.2)
PROT SERPL-MCNC: 6.9 G/DL (ref 6–8.5)
PROT UR QL STRIP: NEGATIVE
RBC # BLD AUTO: 4.45 10*6/MM3 (ref 4.14–5.8)
RBC # UR: ABNORMAL /HPF
REF LAB TEST METHOD: ABNORMAL
SODIUM BLD-SCNC: 141 MMOL/L (ref 136–145)
SP GR UR STRIP: 1.01 (ref 1–1.03)
SQUAMOUS #/AREA URNS HPF: ABNORMAL /HPF
UROBILINOGEN UR QL STRIP: ABNORMAL
WBC NRBC COR # BLD: 9.51 10*3/MM3 (ref 3.4–10.8)
WBC UR QL AUTO: ABNORMAL /HPF

## 2019-11-20 PROCEDURE — 80307 DRUG TEST PRSMV CHEM ANLYZR: CPT | Performed by: FAMILY MEDICINE

## 2019-11-20 PROCEDURE — 93005 ELECTROCARDIOGRAM TRACING: CPT | Performed by: PSYCHIATRY & NEUROLOGY

## 2019-11-20 PROCEDURE — G0432 EIA HIV-1/HIV-2 SCREEN: HCPCS | Performed by: FAMILY MEDICINE

## 2019-11-20 PROCEDURE — 83735 ASSAY OF MAGNESIUM: CPT | Performed by: FAMILY MEDICINE

## 2019-11-20 PROCEDURE — 80074 ACUTE HEPATITIS PANEL: CPT | Performed by: FAMILY MEDICINE

## 2019-11-20 PROCEDURE — 85025 COMPLETE CBC W/AUTO DIFF WBC: CPT | Performed by: FAMILY MEDICINE

## 2019-11-20 PROCEDURE — 81001 URINALYSIS AUTO W/SCOPE: CPT | Performed by: FAMILY MEDICINE

## 2019-11-20 PROCEDURE — HZ2ZZZZ DETOXIFICATION SERVICES FOR SUBSTANCE ABUSE TREATMENT: ICD-10-PCS | Performed by: PSYCHIATRY & NEUROLOGY

## 2019-11-20 PROCEDURE — 99284 EMERGENCY DEPT VISIT MOD MDM: CPT

## 2019-11-20 PROCEDURE — 80053 COMPREHEN METABOLIC PANEL: CPT | Performed by: FAMILY MEDICINE

## 2019-11-20 PROCEDURE — 93010 ELECTROCARDIOGRAM REPORT: CPT | Performed by: INTERNAL MEDICINE

## 2019-11-20 RX ORDER — CLONIDINE HYDROCHLORIDE 0.1 MG/1
0.1 TABLET ORAL 2 TIMES DAILY PRN
Status: ACTIVE | OUTPATIENT
Start: 2019-11-23 | End: 2019-11-24

## 2019-11-20 RX ORDER — BENZTROPINE MESYLATE 1 MG/ML
1 INJECTION INTRAMUSCULAR; INTRAVENOUS ONCE AS NEEDED
Status: DISCONTINUED | OUTPATIENT
Start: 2019-11-20 | End: 2019-11-24 | Stop reason: HOSPADM

## 2019-11-20 RX ORDER — CLONIDINE HYDROCHLORIDE 0.1 MG/1
0.1 TABLET ORAL 4 TIMES DAILY PRN
Status: DISPENSED | OUTPATIENT
Start: 2019-11-21 | End: 2019-11-22

## 2019-11-20 RX ORDER — CLONIDINE HYDROCHLORIDE 0.1 MG/1
0.1 TABLET ORAL DAILY PRN
Status: DISCONTINUED | OUTPATIENT
Start: 2019-11-24 | End: 2019-11-24 | Stop reason: HOSPADM

## 2019-11-20 RX ORDER — BENZONATATE 100 MG/1
100 CAPSULE ORAL 3 TIMES DAILY PRN
Status: DISCONTINUED | OUTPATIENT
Start: 2019-11-20 | End: 2019-11-24 | Stop reason: HOSPADM

## 2019-11-20 RX ORDER — ALUMINA, MAGNESIA, AND SIMETHICONE 2400; 2400; 240 MG/30ML; MG/30ML; MG/30ML
15 SUSPENSION ORAL EVERY 6 HOURS PRN
Status: DISCONTINUED | OUTPATIENT
Start: 2019-11-20 | End: 2019-11-24 | Stop reason: HOSPADM

## 2019-11-20 RX ORDER — BENZTROPINE MESYLATE 1 MG/1
2 TABLET ORAL ONCE AS NEEDED
Status: DISCONTINUED | OUTPATIENT
Start: 2019-11-20 | End: 2019-11-24 | Stop reason: HOSPADM

## 2019-11-20 RX ORDER — DICYCLOMINE HYDROCHLORIDE 10 MG/1
10 CAPSULE ORAL 3 TIMES DAILY PRN
Status: DISCONTINUED | OUTPATIENT
Start: 2019-11-20 | End: 2019-11-24 | Stop reason: HOSPADM

## 2019-11-20 RX ORDER — CLONIDINE HYDROCHLORIDE 0.1 MG/1
0.1 TABLET ORAL 4 TIMES DAILY PRN
Status: ACTIVE | OUTPATIENT
Start: 2019-11-20 | End: 2019-11-21

## 2019-11-20 RX ORDER — ECHINACEA PURPUREA EXTRACT 125 MG
2 TABLET ORAL AS NEEDED
Status: DISCONTINUED | OUTPATIENT
Start: 2019-11-20 | End: 2019-11-24 | Stop reason: HOSPADM

## 2019-11-20 RX ORDER — LOPERAMIDE HYDROCHLORIDE 2 MG/1
2 CAPSULE ORAL
Status: DISCONTINUED | OUTPATIENT
Start: 2019-11-20 | End: 2019-11-24 | Stop reason: HOSPADM

## 2019-11-20 RX ORDER — NICOTINE 21 MG/24HR
1 PATCH, TRANSDERMAL 24 HOURS TRANSDERMAL
Status: DISCONTINUED | OUTPATIENT
Start: 2019-11-20 | End: 2019-11-24 | Stop reason: HOSPADM

## 2019-11-20 RX ORDER — CLONIDINE HYDROCHLORIDE 0.1 MG/1
0.1 TABLET ORAL 3 TIMES DAILY PRN
Status: DISPENSED | OUTPATIENT
Start: 2019-11-22 | End: 2019-11-23

## 2019-11-20 RX ORDER — ACETAMINOPHEN 325 MG/1
650 TABLET ORAL EVERY 6 HOURS PRN
Status: DISCONTINUED | OUTPATIENT
Start: 2019-11-20 | End: 2019-11-24 | Stop reason: HOSPADM

## 2019-11-20 RX ORDER — FAMOTIDINE 20 MG/1
20 TABLET, FILM COATED ORAL 2 TIMES DAILY PRN
Status: DISCONTINUED | OUTPATIENT
Start: 2019-11-20 | End: 2019-11-24 | Stop reason: HOSPADM

## 2019-11-20 RX ORDER — HYDROXYZINE 50 MG/1
50 TABLET, FILM COATED ORAL EVERY 6 HOURS PRN
Status: DISCONTINUED | OUTPATIENT
Start: 2019-11-20 | End: 2019-11-24 | Stop reason: HOSPADM

## 2019-11-20 RX ORDER — TRAZODONE HYDROCHLORIDE 50 MG/1
50 TABLET ORAL NIGHTLY PRN
Status: DISCONTINUED | OUTPATIENT
Start: 2019-11-20 | End: 2019-11-24 | Stop reason: HOSPADM

## 2019-11-20 RX ORDER — CYCLOBENZAPRINE HCL 10 MG
10 TABLET ORAL 3 TIMES DAILY PRN
Status: DISCONTINUED | OUTPATIENT
Start: 2019-11-20 | End: 2019-11-24 | Stop reason: HOSPADM

## 2019-11-20 RX ORDER — ONDANSETRON 4 MG/1
4 TABLET, FILM COATED ORAL EVERY 6 HOURS PRN
Status: DISCONTINUED | OUTPATIENT
Start: 2019-11-20 | End: 2019-11-24 | Stop reason: HOSPADM

## 2019-11-20 RX ADMIN — DICYCLOMINE HYDROCHLORIDE 10 MG: 10 CAPSULE ORAL at 17:44

## 2019-11-20 RX ADMIN — ACETAMINOPHEN 650 MG: 325 TABLET ORAL at 17:42

## 2019-11-20 RX ADMIN — CYCLOBENZAPRINE HYDROCHLORIDE 10 MG: 10 TABLET, FILM COATED ORAL at 17:44

## 2019-11-20 RX ADMIN — ONDANSETRON 4 MG: 4 TABLET, FILM COATED ORAL at 17:44

## 2019-11-20 RX ADMIN — HYDROXYZINE HYDROCHLORIDE 50 MG: 50 TABLET ORAL at 17:44

## 2019-11-20 NOTE — ED PROVIDER NOTES
Subjective   Patient is a 21-year-old male presents the emergency department for detox of heroin.  The patient states that he does not inject heroin but he does snorted.  He states that he has been using some form of opiates for at least 3 or 4 years and has been using heroin for the last year.  He last used heroin about 36 hours ago.  Currently he has nausea, jitteriness, muscle aches, and feels very anxious.  He states that he has detox once before without success.  He has never been in the [facility].  He denies any recent illness, fever, chills or additional symptoms at this time.            Review of Systems   Constitutional: Positive for chills and diaphoresis. Negative for activity change, appetite change, fatigue and fever.   HENT: Negative for congestion, postnasal drip, rhinorrhea, sinus pressure, sinus pain, sneezing and sore throat.    Eyes: Negative for pain, discharge, redness and itching.   Respiratory: Negative for apnea, cough, choking, chest tightness, shortness of breath, wheezing and stridor.    Cardiovascular: Negative for chest pain, palpitations and leg swelling.   Gastrointestinal: Positive for diarrhea and nausea. Negative for abdominal distention, abdominal pain and vomiting.   Endocrine: Negative for cold intolerance and heat intolerance.   Genitourinary: Negative for difficulty urinating and flank pain.   Musculoskeletal: Positive for myalgias. Negative for neck pain and neck stiffness.   Neurological: Negative for dizziness, facial asymmetry and headaches.   Psychiatric/Behavioral: Negative for agitation, behavioral problems and confusion.       History reviewed. No pertinent past medical history.    Allergies   Allergen Reactions   • Nsaids Anaphylaxis       Past Surgical History:   Procedure Laterality Date   • APPENDECTOMY     • BONE GRAFT     • CLEFT PALATE REPAIR     • EAR TUBES     • TONSILLECTOMY         Family History   Problem Relation Age of Onset   • Alcohol abuse Mother     • Suicide Attempts Cousin    • Depression Cousin        Social History     Socioeconomic History   • Marital status:      Spouse name: Not on file   • Number of children: Not on file   • Years of education: Not on file   • Highest education level: Not on file   Tobacco Use   • Smoking status: Current Every Day Smoker     Types: Cigarettes   Substance and Sexual Activity   • Alcohol use: No     Frequency: Never   • Drug use: Yes     Types: Oxycodone, Heroin, Hydrocodone   • Sexual activity: Defer           Objective   Physical Exam   Constitutional: He is oriented to person, place, and time. He appears well-developed and well-nourished. No distress.   HENT:   Head: Normocephalic and atraumatic.   Right Ear: External ear normal.   Left Ear: External ear normal.   Nose: Nose normal.   Mouth/Throat: Oropharynx is clear and moist. No oropharyngeal exudate.   Eyes: Conjunctivae and EOM are normal. Pupils are equal, round, and reactive to light. Right eye exhibits no discharge. Left eye exhibits no discharge. No scleral icterus.   Pupils are dilated at 4 mm they are equal round reactive to light   Neck: Normal range of motion. Neck supple. No JVD present. No tracheal deviation present. No thyromegaly present.   Cardiovascular: Normal rate, regular rhythm, normal heart sounds and intact distal pulses. Exam reveals no gallop and no friction rub.   No murmur heard.  Pulmonary/Chest: Effort normal and breath sounds normal. No stridor. No respiratory distress. He has no wheezes. He has no rales.   Abdominal: Soft. Bowel sounds are normal. He exhibits no distension and no mass. There is no tenderness. There is no guarding.   Neurological: He is alert and oriented to person, place, and time.   Skin: Skin is warm and dry. Capillary refill takes less than 2 seconds. No rash noted. He is not diaphoretic. No erythema. No pallor.   Psychiatric: He has a normal mood and affect. His behavior is normal.   Nursing note and vitals  reviewed.      Procedures           ED Course                  MDM  Number of Diagnoses or Management Options  Heroin withdrawal (CMS/HCC): new and requires workup     Amount and/or Complexity of Data Reviewed  Clinical lab tests: ordered and reviewed  Tests in the medicine section of CPT®: ordered and reviewed    Risk of Complications, Morbidity, and/or Mortality  Presenting problems: high  Diagnostic procedures: high  Management options: high    Critical Care  Total time providing critical care: < 30 minutes    Patient Progress  Patient progress: stable      Final diagnoses:   Heroin withdrawal (CMS/HCC)              Fabiana Castellano DO  11/20/19 1500

## 2019-11-20 NOTE — NURSING NOTE
Pt seeking help to detox from Heroin. He reports beginning use of opiates 5-6 years ago recreationally. He is snorting 1 gram Heroin daily x 1 year. He reports in patient detox in early spring x 4 days and was clean for 4-5 weeks with f/u of out patient treatment. Pt is currently on parole and court ordered for treatment after dirty drug screen. Pt reports that he had already decided to seek treatment and is planning to go to [facility] after detox. His wife is also seeking treatment she is on [facility] parole, he is on [facility] parole. She was not accepted d/t patient being admitted. Pt has been living with his mother, sister, niece and grandfather in [place]. His wife has been living with her mother. Anxiety rated 7/10, depression rated 6/10 and craving rated 10/10. COW score on admit was 16. Appetite and sleep are reported as poor. Pt denies medical problems, he has hx of cleft lip and palate repair.

## 2019-11-20 NOTE — NURSING NOTE
Intake assessment completed. Pt referred by [facility] for heroin detox. Reports over the last year he has been snorting 1 Gr or more daily. Pt reported being on opiods since he was a teenager. Pt states he was court ordered to enter rehab for failing a drug test while on probation. He denies si, hi, or AVH. COWS 16

## 2019-11-21 PROBLEM — F17.200 TOBACCO USE DISORDER: Status: ACTIVE | Noted: 2019-11-21

## 2019-11-21 PROBLEM — F11.23 OPIOID DEPENDENCE WITH WITHDRAWAL (HCC): Status: ACTIVE | Noted: 2019-11-20

## 2019-11-21 PROCEDURE — 99223 1ST HOSP IP/OBS HIGH 75: CPT | Performed by: PSYCHIATRY & NEUROLOGY

## 2019-11-21 RX ADMIN — CYCLOBENZAPRINE HYDROCHLORIDE 10 MG: 10 TABLET, FILM COATED ORAL at 17:15

## 2019-11-21 RX ADMIN — NICOTINE 1 PATCH: 21 PATCH, EXTENDED RELEASE TRANSDERMAL at 08:55

## 2019-11-21 RX ADMIN — CLONIDINE HYDROCHLORIDE 0.1 MG: 0.1 TABLET ORAL at 17:14

## 2019-11-21 RX ADMIN — HYDROXYZINE HYDROCHLORIDE 50 MG: 50 TABLET ORAL at 17:15

## 2019-11-21 RX ADMIN — ONDANSETRON 4 MG: 4 TABLET, FILM COATED ORAL at 17:15

## 2019-11-21 RX ADMIN — TRAZODONE HYDROCHLORIDE 50 MG: 50 TABLET ORAL at 20:25

## 2019-11-21 NOTE — PLAN OF CARE
Problem: Patient Care Overview  Goal: Plan of Care Review  Outcome: Ongoing (interventions implemented as appropriate)   11/20/19 2010   Coping/Psychosocial   Plan of Care Reviewed With patient   Plan of Care Review   Progress no change   OTHER   Outcome Summary new admit

## 2019-11-21 NOTE — H&P
"INITIAL PSYCHIATRIC HISTORY & PHYSICAL    Patient Identification:  Name:   Stephon Jauregui  Age:   29 y.o.  Sex:   male  :   1990  MRN:   [id number]  Visit Number:   [id number]  Primary Care Physician:   Provider, No Known    SUBJECTIVE    CC/Focus of Exam: Opioid dependence with withdrawal    HPI: Stephon Jauregui is a 29 y.o. male who was admitted on 2019 with complaints of heroin abuse.  He reports that he has been snorting 1 g of heroin daily for the past year.  Reports his initial use of opiates began after he had received several surgeries as an adolescent and child for complete cleft palate.  He reports that he was ordered opiates after each surgery, he reports that he had countless surgeries to repair the facial structures from birth defects.  He then began recreationally abusing opiates that were not a prescription 6 years ago.  He reports that he and his wife are both seeking treatment and being supportive of each other.  He reports that his substance abuse has created stressors in which he has been ordered for state parole.  He rates anxiety 7 of 10, depression rated 6 of 10 and craving rated 10 out of 10.  COWS score upon admission, 16.  He reports poor appetite and poor sleep.  He reports a history attempting sobriety \"several times \"on his own without success.  Reports a period of sobriety for 2 months while in IOP treatment.  Reports that his relapse was due to life stressors.  He states that financial impacts of his substance abuse has been that he has lost his job, his home, and cars.  He denies depression, denies anxiety, denies suicidal ideation, denies homicidal ideation.  He reports withdrawal symptoms: Chills, stomach upset, nausea, hot and cold flashes, body aches, joint aches, muscle aches.  Patient denies any auditory or visual hallucinations at this time.  Patient is very cooperative and pleasant upon assessment.  He denies history of neglect or abuse.  Patient " denies suicidal ideation, patient denies homicidal ideation.  Patient has allergy to NSAIDs.  Available medical/psychiatric records reviewed and incorporated into the current document.   PAST PSYCHIATRIC HX:He reports in patient detox in early spring x 4 days and was clean for 4-5 weeks with f/u of out patient treatment.  Denies any outpatient psychiatric treatment.  Patient was attending outpatient rehabilitation treatment, in which she completed.  Patient denies any history of psychiatric inpatient admissions.    SUBSTANCE USE HX: Recreational abuse of opiates 6 years ago.  Reports using 1 g of heroin daily for the past 1 year.  He denies any alcohol abuse.  He does report smoking 1 pack/day for the past 10 years.  Patient reports that he does have future plans to stop smoking in the future.    SOCIAL HX: Patient is a 29-year-old male currently living in [facility] with his mother for the past 2 months.  He last worked 2 weeks ago.  She does have some college education, he reports that he was unable to finish his college degree due to lack of funds.  Patient and his wife are both at this time seeking treatment for recovery.  She is attempting to seek treatment in another facility.  Patient plans to long-term treatment at [facility] after discharge.  Patient is on parole currently with [facility].    Past Medical History:   Diagnosis Date   • Substance abuse (CMS/[facility])    • Withdrawal symptoms, drug or narcotic (CMS/[facility])        Past Surgical History:   Procedure Laterality Date   • APPENDECTOMY     • BONE GRAFT     • CLEFT PALATE REPAIR     • EAR TUBES     • TONSILLECTOMY         Family History   Problem Relation Age of Onset   • Alcohol abuse Mother    • Anxiety disorder Mother    • Depression Mother    • Suicide Attempts Cousin    • Depression Cousin    • Alcohol abuse Sister    • Anxiety disorder Sister    • Depression Sister          No medications prior to admission.           ALLERGIES:   Nsaids    Temp:  [97 °F (36.1 °C)-98.2 °F (36.8 °C)] 98.2 °F (36.8 °C)  Heart Rate:  [52-98] 66  Resp:  [16-18] 18  BP: (100-124)/(58-82) 116/68    REVIEW OF SYSTEMS:  Review of Systems   Constitutional: Positive for chills and diaphoresis.   HENT: Positive for congestion.         See HPI   Eyes: Negative.    Respiratory: Negative.    Cardiovascular: Negative.    Gastrointestinal: Positive for nausea.   Endocrine: Negative.    Genitourinary: Negative.    Musculoskeletal: Positive for arthralgias and myalgias.        See HPI.   Skin: Positive for color change.        Patient has small repair of cleft lip.  Patient reports extensive cleft palate repair prior to 19 years of age.   Allergic/Immunologic: Negative.    Neurological: Positive for tremors and weakness.        See HPI.   Hematological: Negative.    Psychiatric/Behavioral: Negative.       See HPI for psychiatric ROS  OBJECTIVE    PHYSICAL EXAM:    Physical Exam   Constitutional: oriented to person, place, and time. Appears well-developed and well-nourished.   HENT:   Head: Normocephalic and atraumatic.   Right Ear: External ear normal.   Left Ear: External ear normal.   Mouth/Throat: Oropharynx is clear and moist.   Eyes: Pupils are equal, round, and reactive to light. Conjunctivae and EOM are normal.   Neck: Normal range of motion. Neck supple.   Cardiovascular: Normal rate, regular rhythm and normal heart sounds.    Pulmonary/Chest: Effort normal and breath sounds normal. No respiratory distress. No wheezes.   Abdominal: Soft. Bowel sounds are normal.No distension. There is no tenderness.   Musculoskeletal: Normal range of motion. No edema or deformity.   Neurological:Alert and oriented to person, place, and time. No cranial nerve deficit. Coordination normal.   Skin: Skin is warm and dry. No rash noted. No erythema.         MENTAL STATUS EXAM:               Hygiene:   good  Cooperation:  Cooperative  Eye Contact:  Fair  Psychomotor Behavior:   Appropriate  Affect:  Appropriate  Hopelessness: Denies  Speech:  Normal  Thought Progress:  Goal directed  Thought Content:  Mood congruent  Suicidal:  None  Homicidal:  None  Hallucinations:  None  Delusion:  None  Memory:  Intact  Orientation:  Person, Place, Time and Situation  Reliability:  fair  Insight:  Fair  Judgement:  Fair  Impulse Control:  Fair      Imaging Results (Last 24 Hours)     ** No results found for the last 24 hours. **           ECG/EMG Results (most recent)     Procedure Component Value Units Date/Time    ECG 12 Lead [[id number]] Collected:  11/20/19 2039     Updated:  11/21/19 1656    Narrative:       Test Reason : Baseline Cardiac Status  Blood Pressure : **/** mmHG  Vent. Rate : 060 BPM     Atrial Rate : 060 BPM     P-R Int : 142 ms          QRS Dur : 088 ms      QT Int : 420 ms       P-R-T Axes : 026 080 073 degrees     QTc Int : 420 ms    Normal sinus rhythm  Normal ECG  No previous ECGs available  Confirmed by Rupesh Pearson ([id number]) on 11/21/2019 4:56:18 PM    Referred By:             Confirmed By:Rupesh Pearson           Lab Results   Component Value Date    GLUCOSE 101 (H) 11/20/2019    BUN 9 11/20/2019    CREATININE 0.74 (L) 11/20/2019    EGFRIFNONA 126 11/20/2019    BCR 12.2 11/20/2019    CO2 28.9 11/20/2019    CALCIUM 9.6 11/20/2019    ALBUMIN 4.50 11/20/2019    AST 13 11/20/2019    ALT 11 11/20/2019       Lab Results   Component Value Date    WBC 9.51 11/20/2019    HGB 14.5 11/20/2019    HCT 43.7 11/20/2019    MCV 98.2 (H) 11/20/2019     11/20/2019       Pain Management Panel     Pain Management Panel Latest Ref Rng & Units 11/20/2019    AMPHETAMINES SCREEN, URINE Negative Negative    BARBITURATES SCREEN Negative Negative    BENZODIAZEPINE SCREEN, URINE Negative Negative    BUPRENORPHINEUR Negative Negative    COCAINE SCREEN, URINE Negative Negative    METHADONE SCREEN, URINE Negative Negative          Brief Urine Lab Results  (Last result in the past 365 days)       Color   Clarity   Blood   Leuk Est   Nitrite   Protein   CREAT   Urine HCG        11/20/19 1339 Yellow Cloudy Trace Small (1+) Negative Negative               DATA:  Labs reviewed  EKG reviewed  Record reviewed: The patient has had one prior detox and was able to stay sober for 2 months. He plans to go to long term rehab after this detox treatment.      ASSESSMENT & PLAN:        Opioid dependence with withdrawal (CMS/[facility])  - Clonidine detox  - Discussed the option of Subutex detox and patient agreed to wait until the withdrawals are more severe      Tobacco use disorder  - Offered patient nicoderm patch but he would like to continue smoking.      UTI  - Pt reports no symptoms of dysuria, but UA shows trace blood, small amount of leukocyte esterase, RBCs, WBCs and sq epithelial cells. Will repeat UA in am.      The patient has been admitted for safety and stabilization.  Patient will be monitored for suicidality daily and maintained on Suicide precaution Level 3 (q15 min checks) .  The patient will have individual and group therapy with a master's level therapist. A master treatment plan will be developed and agreed upon by the patient and his/her treatment team.  The patient's estimated length of stay in the hospital is 5-7 days.       Written by Radhika Bro, acting as scribe for Dr. SIXTO lopes. Dr. Hamlin's signature on this note affirms that the note adequately documents the care provided.     Radhika Bro  11/21/19  5:32 PM      ISusan MD, personally performed the services described in this documentation as scribed by the above named individual in my presence, and it is both accurate and complete.

## 2019-11-21 NOTE — PLAN OF CARE
Problem: Patient Care Overview  Goal: Plan of Care Review  Outcome: Ongoing (interventions implemented as appropriate)   11/21/19 1650   Coping/Psychosocial   Plan of Care Reviewed With patient   Plan of Care Review   Progress no change   OTHER   Outcome Summary Pt has been quite and to self most of shift. Rates anxiety and depression 6, report body aches, cold chills and nausea rates craving 9.   Coping/Psychosocial   Patient Agreement with Plan of Care agrees       Problem: Overarching Goals (Adult)  Goal: Adheres to Safety Considerations for Self and Others  Outcome: Ongoing (interventions implemented as appropriate)    Goal: Optimized Coping Skills in Response to Life Stressors  Outcome: Ongoing (interventions implemented as appropriate)    Goal: Develops/Participates in Therapeutic [place] to Support Successful Transition  Outcome: Ongoing (interventions implemented as appropriate)      Problem: Impaired Control (Excessive Substance Use) (Adult)  Goal: Participates in Recovery Program (Excessive Substance Use)  Outcome: Ongoing (interventions implemented as appropriate)      Problem: Social/Occupational/Functional Impairment (Excessive Substance Use) (Adult)  Goal: Improved Social/Occupational/Functional Skills (Excessive Substance Use)  Outcome: Ongoing (interventions implemented as appropriate)      Problem: Safety Awareness Impairment (Excessive Substance Use) (Adult)  Goal: Enhanced Safety Awareness (Excessive Substance Use)  Outcome: Ongoing (interventions implemented as appropriate)      Problem: Physiological Impairment (Excessive Substance Use) (Adult)  Goal: Improved Physiologic Symptoms (Excessive Substance Use)  Outcome: Ongoing (interventions implemented as appropriate)      Problem: Mood Impairment (Anxiety Signs/Symptoms) (Adult)  Goal: Improved Mood Symptoms (Anxiety Signs/Symptoms)  Outcome: Ongoing (interventions implemented as appropriate)      Problem: Activity/Energy/Vigor Impairment  (Anxiety Signs/Symptoms) (Adult)  Goal: Improved Energy/Vigor (Anxiety Signs/Symptoms)  Outcome: Ongoing (interventions implemented as appropriate)      Problem: Somatic Disturbance (Anxiety Signs/Symptoms) (Adult)  Goal: Improved/Managed Somatic Symptoms (Anxiety Signs/Symptoms)  Outcome: Ongoing (interventions implemented as appropriate)      Problem: Sleep Impairment (Anxiety Signs/Symptoms) (Adult)  Goal: Improved Sleep Hygiene (Anxiety Signs/Symptoms)  Outcome: Ongoing (interventions implemented as appropriate)      Problem: Mood Impairment (Depressive Signs/Symptoms) (Adult)  Goal: Improved Mood Symptoms (Depressive Signs/Symptoms)  Outcome: Ongoing (interventions implemented as appropriate)

## 2019-11-21 NOTE — PLAN OF CARE
Problem: Patient Care Overview  Goal: Plan of Care Review  Outcome: Ongoing (interventions implemented as appropriate)   11/21/19 1055   Coping/Psychosocial   Plan of Care Reviewed With patient   Plan of Care Review   Progress no change   OTHER   Outcome Summary Completed social history. Reviewed treatment plans.    Coping/Psychosocial   Patient Agreement with Plan of Care agrees   Coping/Psychosocial   Consent Given to Review Plan with [facility]     Goal: Individualization and Mutuality  Outcome: Ongoing (interventions implemented as appropriate)   11/21/19 1055   Personal Strengths/Vulnerabilities   Patient Personal Strengths resilient;resourceful;family/social support;community support;motivated for treatment   Patient Vulnerabilities Disease of addiction.    Individualization   Patient Specific Goals (Include Timeframe) Identify 1-2 cognitive distortions   Patient Specific Interventions CBT     Goal: Discharge Needs Assessment  Outcome: Ongoing (interventions implemented as appropriate)   11/21/19 1055   Discharge Needs Assessment   Readmission Within the Last 30 Days no previous admission in last 30 days   Concerns to be Addressed discharge planning;substance/tobacco abuse/use   Patient/Family Anticipates Transition to inpatient rehabilitation facility   Patient/Family Anticipated Services at Transition rehabilitation services   Transportation Concerns car, none   Transportation Anticipated agency   Offered/Gave Vendor List no   Patient's Choice of Community Agency(s) [facility]   Current Discharge Risk substance use/abuse   Discharge Coordination/Progress [facility]   Discharge Needs Assessment,    Outpatient/Agency/Support Group Needs 12 step program (specify);residential services;support group(s) (specify)   Anticipated Discharge Disposition inpatient rehab facility     Goal: Interprofessional Rounds/Family Conf  Outcome: Ongoing (interventions implemented as appropriate)   11/21/19 1055    Interdisciplinary Rounds/Family Conf   Summary Will discuss patient's case with the treatment team   Interdisciplinary Rounds/Family Conf   Participants patient;social work;psychiatrist;nursing       [id number]  D: Met with the patient in the office, completing the social history assessment and reviewing the treatment plans.  Patient agreeable. The patient is a 29-year-old  male currently residing in [facility] where he has been living with his mother for the past 2 months.  He has some college education.  He last worked 2 weeks ago.  He identified himself as an addict, reporting his drugs of choice were opiates.  He is currently on probation.  Assisted the patient in contacting his office or out of [facility] to report his whereabouts.  He plans for admission to [facility] directly upon discharge.  Also, provided the patient opportunity to contact his wife and learn of her whereabouts.  She had attempted to come for detox at the same time, and was referred to a different facility.    A: The patient's affect appeared depressed.  He was polite and cooperative during the assessment.  It seemed his primary motivation to change was his son, as well as his wife.    P: The patient has been placed on a detox regimen.  He will be monitored routinely for his safety.  He will be provided with individual and group therapy.  He will follow-up with [facility] directly upon discharge.

## 2019-11-22 LAB
BACTERIA UR QL AUTO: ABNORMAL /HPF
BILIRUB UR QL STRIP: NEGATIVE
CLARITY UR: CLEAR
COLOR UR: YELLOW
GLUCOSE UR STRIP-MCNC: NEGATIVE MG/DL
HGB UR QL STRIP.AUTO: NEGATIVE
HYALINE CASTS UR QL AUTO: ABNORMAL /LPF
KETONES UR QL STRIP: NEGATIVE
LEUKOCYTE ESTERASE UR QL STRIP.AUTO: ABNORMAL
NITRITE UR QL STRIP: NEGATIVE
PH UR STRIP.AUTO: 7 [PH] (ref 5–8)
PROT UR QL STRIP: NEGATIVE
RBC # UR: ABNORMAL /HPF
REF LAB TEST METHOD: ABNORMAL
SP GR UR STRIP: 1.01 (ref 1–1.03)
SQUAMOUS #/AREA URNS HPF: ABNORMAL /HPF
UROBILINOGEN UR QL STRIP: ABNORMAL
WBC UR QL AUTO: ABNORMAL /HPF

## 2019-11-22 PROCEDURE — 99232 SBSQ HOSP IP/OBS MODERATE 35: CPT | Performed by: PSYCHIATRY & NEUROLOGY

## 2019-11-22 PROCEDURE — 81001 URINALYSIS AUTO W/SCOPE: CPT | Performed by: PSYCHIATRY & NEUROLOGY

## 2019-11-22 RX ORDER — BUPRENORPHINE 2 MG/1
2 TABLET SUBLINGUAL 2 TIMES DAILY
Status: COMPLETED | OUTPATIENT
Start: 2019-11-22 | End: 2019-11-22

## 2019-11-22 RX ORDER — BUPRENORPHINE 2 MG/1
2 TABLET SUBLINGUAL DAILY
Status: COMPLETED | OUTPATIENT
Start: 2019-11-23 | End: 2019-11-23

## 2019-11-22 RX ADMIN — HYDROXYZINE HYDROCHLORIDE 50 MG: 50 TABLET ORAL at 08:26

## 2019-11-22 RX ADMIN — CLONIDINE HYDROCHLORIDE 0.1 MG: 0.1 TABLET ORAL at 08:26

## 2019-11-22 RX ADMIN — ACETAMINOPHEN 650 MG: 325 TABLET ORAL at 08:26

## 2019-11-22 RX ADMIN — ONDANSETRON 4 MG: 4 TABLET, FILM COATED ORAL at 08:26

## 2019-11-22 RX ADMIN — BUPRENORPHINE HCL 2 MG: 2 TABLET SUBLINGUAL at 20:40

## 2019-11-22 RX ADMIN — BUPRENORPHINE HCL 2 MG: 2 TABLET SUBLINGUAL at 09:37

## 2019-11-22 RX ADMIN — DICYCLOMINE HYDROCHLORIDE 10 MG: 10 CAPSULE ORAL at 08:26

## 2019-11-22 RX ADMIN — TRAZODONE HYDROCHLORIDE 50 MG: 50 TABLET ORAL at 21:24

## 2019-11-22 RX ADMIN — LOPERAMIDE HYDROCHLORIDE 2 MG: 2 CAPSULE ORAL at 08:26

## 2019-11-22 RX ADMIN — CYCLOBENZAPRINE HYDROCHLORIDE 10 MG: 10 TABLET, FILM COATED ORAL at 08:26

## 2019-11-22 NOTE — PLAN OF CARE
Problem: Patient Care Overview  Goal: Plan of Care Review  Outcome: Ongoing (interventions implemented as appropriate)   11/21/19 2116   Coping/Psychosocial   Plan of Care Reviewed With patient   Plan of Care Review   Progress no change   OTHER   Outcome Summary Patient calm and cooperative. A&Ox3. Rates anxiety 6/10. Depression 4/10. Cravings 7/10. Wd's body aches, stomach cramps, nausea, sweats/chills, and vomiting. C/O joint pain 8/10. No other issues noted. Will continue to monitor.    Coping/Psychosocial   Patient Agreement with Plan of Care agrees       Problem: Overarching Goals (Adult)  Goal: Adheres to Safety Considerations for Self and Others  Outcome: Ongoing (interventions implemented as appropriate)    Goal: Optimized Coping Skills in Response to Life Stressors  Outcome: Ongoing (interventions implemented as appropriate)    Goal: Develops/Participates in Therapeutic [place] to Support Successful Transition  Outcome: Ongoing (interventions implemented as appropriate)

## 2019-11-22 NOTE — PROGRESS NOTES
"INPATIENT PSYCHIATRIC PROGRESS NOTE    Name:  Stephon Jauregui  :  1990  MRN:  [id number]  Visit Number:  [id number]  Length of stay:  2    SUBJECTIVE  CC/Focus of Exam: opioid use and withdrawals    INTERVAL HISTORY:  The patient states he is not feeling good and clonidine is not helping with the withdrawal symptoms.  Depression rating 5/10  Anxiety rating 6/10  Sleep: \"horrible\"  Withdrawal sx: cold chills, bone, joint and muscle pain, nausea, vomiting, diarrhea, leg and muscle cramps  Cravin/10    Review of Systems   Constitutional: Positive for chills and diaphoresis.   HENT: Negative.    Respiratory: Negative.    Cardiovascular: Negative.    Gastrointestinal: Positive for diarrhea, nausea and vomiting.   Musculoskeletal: Positive for arthralgias and myalgias.   Neurological: Positive for tremors.   Psychiatric/Behavioral: The patient is nervous/anxious.        OBJECTIVE    Temp:  [97.4 °F (36.3 °C)-98.2 °F (36.8 °C)] 97.9 °F (36.6 °C)  Heart Rate:  [62-91] 78  Resp:  [16-18] 18  BP: (103-118)/(60-74) 113/70    MENTAL STATUS EXAM:  Appearance:Casually dressed, good hygeine.   Cooperation:Cooperative  Psychomotor: No psychomotor agitation/retardation, No EPS, No motor tics  Speech-normal rate, amount.  Mood \"anxious\"   Affect-congruent, appropriate, stable  Thought Content-goal directed, no delusional material present  Thought process-linear, organized.  Suicidality: No SI  Homicidality: No HI  Perception: No AH/VH  Insight-fair   Judgement-fair    Lab Results (last 24 hours)     ** No results found for the last 24 hours. **             Imaging Results (Last 24 Hours)     ** No results found for the last 24 hours. **             ECG/EMG Results (most recent)     Procedure Component Value Units Date/Time    ECG 12 Lead [[id number]] Collected:  19     Updated:  19    Narrative:       Test Reason : Baseline Cardiac Status  Blood Pressure : **/** mmHG  Vent. Rate : 060 BPM     " Atrial Rate : 060 BPM     P-R Int : 142 ms          QRS Dur : 088 ms      QT Int : 420 ms       P-R-T Axes : 026 080 073 degrees     QTc Int : 420 ms    Normal sinus rhythm  Normal ECG  No previous ECGs available  Confirmed by Rupesh Pearson () on 2019 4:56:18 PM    Referred By:             Confirmed By:Rupesh Pearson           ALLERGIES: Nsaids      Current Facility-Administered Medications:   •  acetaminophen (TYLENOL) tablet 650 mg, 650 mg, Oral, Q6H PRN, Eliseo Escobedo MD, 650 mg at 19 0826  •  aluminum-magnesium hydroxide-simethicone (MAALOX MAX) 400-400-40 MG/5ML suspension 15 mL, 15 mL, Oral, Q6H PRN, Eliseo Escobedo MD  •  benzonatate (TESSALON) capsule 100 mg, 100 mg, Oral, TID PRN, Eliseo Escobedo MD  •  benztropine (COGENTIN) tablet 2 mg, 2 mg, Oral, Once PRN **OR** benztropine (COGENTIN) injection 1 mg, 1 mg, Intramuscular, Once PRN, Eliseo Escobedo MD  •  buprenorphine (SUBUTEX) SL tablet 2 mg, 2 mg, Sublingual, BID **FOLLOWED BY** [START ON 2019] buprenorphine (SUBUTEX) SL tablet 2 mg, 2 mg, Sublingual, Daily, Susan Hinds MD  •  [] cloNIDine (CATAPRES) tablet 0.1 mg, 0.1 mg, Oral, 4x Daily PRN, 0.1 mg at 19 1714 **FOLLOWED BY** cloNIDine (CATAPRES) tablet 0.1 mg, 0.1 mg, Oral, TID PRN, 0.1 mg at 19 0826 **FOLLOWED BY** [START ON 2019] cloNIDine (CATAPRES) tablet 0.1 mg, 0.1 mg, Oral, BID PRN **FOLLOWED BY** [START ON 2019] cloNIDine (CATAPRES) tablet 0.1 mg, 0.1 mg, Oral, Daily PRN, Eliseo Escobedo MD  •  cyclobenzaprine (FLEXERIL) tablet 10 mg, 10 mg, Oral, TID PRN, Eliseo Escobedo MD, 10 mg at 19  •  dicyclomine (BENTYL) capsule 10 mg, 10 mg, Oral, TID PRN, Eliseo Escobedo MD, 10 mg at 19  •  famotidine (PEPCID) tablet 20 mg, 20 mg, Oral, BID PRN, Eliseo Escobedo MD  •  hydrOXYzine (ATARAX) tablet 50 mg, 50 mg, Oral, Q6H PRN, Eliseo Escobedo MD, 50 mg at 19  •  loperamide (IMODIUM) capsule  2 mg, 2 mg, Oral, Q2H PRN, Eliseo Escobedo MD, 2 mg at 11/22/19 0826  •  magnesium hydroxide (MILK OF MAGNESIA) suspension 2400 mg/10mL 10 mL, 10 mL, Oral, Daily PRN, Eliseo Escobedo MD  •  nicotine (NICODERM CQ) 21 MG/24HR patch 1 patch, 1 patch, Transdermal, Q24H, Eliseo Escobedo MD, 1 patch at 11/21/19 0855  •  ondansetron (ZOFRAN) tablet 4 mg, 4 mg, Oral, Q6H PRN, Eliseo Escobedo MD, 4 mg at 11/22/19 0826  •  sodium chloride nasal spray 2 spray, 2 spray, Each Nare, PRN, Eliseo Escobedo MD  •  traZODone (DESYREL) tablet 50 mg, 50 mg, Oral, Nightly PRN, Eliseo Escobedo MD, 50 mg at 11/21/19 2025    ASSESSMENT & PLAN:      Opioid dependence with withdrawal (CMS/[facility])  - Clonidine detox  - Subutex detox      Tobacco use disorder  - Encouraged patient to consider stopping tobacco use.      UTI  - Repeat UA pending    Suicide precautions: Suicide precuation Level 4 (q30 min checks)    Behavioral Health Treatment Plan and Problem List: I have reviewed and approved the Behavioral Health Treatment Plan and Problem list.  The patient has had a chance to review and agrees with the treatment plan.     Clinician:  Susan Hinds MD  11/22/19  8:59 AM

## 2019-11-22 NOTE — PROGRESS NOTES
[id number]  D: Met with the patient individually in the dayroom, assessing his needs and discussing aftercare plans. Inquired as to the patient's readiness to discharge tomorrow and go to [facility]. Patient reported if he awoke tomorrow morning feeling the same way he had felt this morning, then he wouldn't be ready. He reported some worrisome thoughts about transportation, explaining the staff in the ER told him transportation would be an issue. Therapist informed him the process of admission to [facility] was usually smooth, and this seemed to ease the patient's mind.     A: The patient's affect appeared anxious. He was polite and cooperative. It seemed he was worried about continued withdrawal symptoms upon discharge.     P: The patient will continue hospitalization. Nursing staff reported having spoken with [facility] today. They informed [facility] the discharge date could be tomorrow. [facility] will be called tomorrow for final confirmation.     1520  Spoke with the patient again in the office. He requested to contact his wife to learn of her wellbeing. He had learned she was denied admission to a detox in [place], and he was not certain if she was en route to [facility] yet or not. He called her and learned her father was transporting her to [facility] at this time. He was relieved.

## 2019-11-23 PROCEDURE — 99232 SBSQ HOSP IP/OBS MODERATE 35: CPT | Performed by: PSYCHIATRY & NEUROLOGY

## 2019-11-23 RX ADMIN — TRAZODONE HYDROCHLORIDE 50 MG: 50 TABLET ORAL at 21:21

## 2019-11-23 RX ADMIN — BUPRENORPHINE HCL 2 MG: 2 TABLET SUBLINGUAL at 08:11

## 2019-11-23 RX ADMIN — ONDANSETRON 4 MG: 4 TABLET, FILM COATED ORAL at 08:21

## 2019-11-23 RX ADMIN — ACETAMINOPHEN 650 MG: 325 TABLET ORAL at 08:11

## 2019-11-23 NOTE — PROGRESS NOTES
Therapist provided ABRAHAM Giron with contact number for [facility] [phone] in the event that patient is discharged later today and needs transport.

## 2019-11-23 NOTE — PROGRESS NOTES
"INPATIENT PSYCHIATRIC PROGRESS NOTE    Name:  Stephon Jauregui  :  1990  MRN:  [id number]  Visit Number:  [id number]  Length of stay:  3    SUBJECTIVE  CC/Focus of Exam: opioid use and withdrawals    INTERVAL HISTORY:  The patient states he is feeling a lot better though has ongoing withdrawal symptoms.  Depression rating 310  Anxiety rating /10  Sleep: \"some better\"  Withdrawal sx: nause  Cravin/10    Review of Systems   Constitutional: Negative.    HENT: Negative.    Respiratory: Negative.    Cardiovascular: Negative.    Gastrointestinal: Positive for nausea.   Musculoskeletal: Positive for myalgias.       OBJECTIVE    Temp:  [96.9 °F (36.1 °C)-98 °F (36.7 °C)] 97.5 °F (36.4 °C)  Heart Rate:  [60-87] 87  Resp:  [16-18] 18  BP: (108-136)/(60-96) 121/65    MENTAL STATUS EXAM:  Appearance:Casually dressed, good hygeine.   Cooperation:Cooperative  Psychomotor: No psychomotor agitation/retardation, No EPS, No motor tics  Speech-normal rate, amount.  Mood \"anxious\"   Affect-congruent, appropriate, stable  Thought Content-goal directed, no delusional material present  Thought process-linear, organized.  Suicidality: No SI  Homicidality: No HI  Perception: No AH/VH  Insight-fair   Judgement-fair    Lab Results (last 24 hours)     Procedure Component Value Units Date/Time    Urinalysis With Microscopic - Urine, Clean Catch [[id number]] Collected:  19 1520    Specimen:  Urine, Clean Catch Updated:  19 1600    Narrative:       The following orders were created for panel order Urinalysis With Microscopic - Urine, Clean Catch.  Procedure                               Abnormality         Status                     ---------                               -----------         ------                     Urinalysis without micro...[[id number]]  Abnormal            Final result               Urinalysis, Microscopic ...[[id number]]  Abnormal            Final result                 Please view results for " these tests on the individual orders.    Urinalysis, Microscopic Only - Urine, Clean Catch [[id number]]  (Abnormal) Collected:  11/22/19 1520    Specimen:  Urine, Clean Catch Updated:  11/22/19 1600     RBC, UA 0-2 /HPF      WBC, UA 3-5 /HPF      Bacteria, UA None Seen /HPF      Squamous Epithelial Cells, UA 0-2 /HPF      Hyaline Casts, UA None Seen /LPF      Methodology Automated Microscopy    Urinalysis without microscopic (no culture) - Urine, Clean Catch [[id number]]  (Abnormal) Collected:  11/22/19 1520    Specimen:  Urine, Clean Catch Updated:  11/22/19 1558     Color, UA Yellow     Appearance, UA Clear     pH, UA 7.0     Specific Gravity, UA 1.010     Glucose, UA Negative     Ketones, UA Negative     Bilirubin, UA Negative     Blood, UA Negative     Protein, UA Negative     Leuk Esterase, UA Trace     Nitrite, UA Negative     Urobilinogen, UA 0.2 E.U./dL             Imaging Results (Last 24 Hours)     ** No results found for the last 24 hours. **             ECG/EMG Results (most recent)     Procedure Component Value Units Date/Time    ECG 12 Lead [[id number]] Collected:  11/20/19 2039     Updated:  11/21/19 1656    Narrative:       Test Reason : Baseline Cardiac Status  Blood Pressure : **/** mmHG  Vent. Rate : 060 BPM     Atrial Rate : 060 BPM     P-R Int : 142 ms          QRS Dur : 088 ms      QT Int : 420 ms       P-R-T Axes : 026 080 073 degrees     QTc Int : 420 ms    Normal sinus rhythm  Normal ECG  No previous ECGs available  Confirmed by Rupesh Pearson (2020) on 11/21/2019 4:56:18 PM    Referred By:             Confirmed By:Rupesh Pearson           ALLERGIES: Nsaids      Current Facility-Administered Medications:   •  acetaminophen (TYLENOL) tablet 650 mg, 650 mg, Oral, Q6H PRN, Eliseo Escobedo MD, 650 mg at 11/23/19 0811  •  aluminum-magnesium hydroxide-simethicone (MAALOX MAX) 400-400-40 MG/5ML suspension 15 mL, 15 mL, Oral, Q6H PRN, Eliseo Escobedo MD  •  benzonatate (TESSALON) capsule  100 mg, 100 mg, Oral, TID PRN, Eliseo Escobedo MD  •  benztropine (COGENTIN) tablet 2 mg, 2 mg, Oral, Once PRN **OR** benztropine (COGENTIN) injection 1 mg, 1 mg, Intramuscular, Once PRN, Eliseo Escobedo MD  •  [] cloNIDine (CATAPRES) tablet 0.1 mg, 0.1 mg, Oral, 4x Daily PRN, 0.1 mg at 19 1714 **FOLLOWED BY** [] cloNIDine (CATAPRES) tablet 0.1 mg, 0.1 mg, Oral, TID PRN, 0.1 mg at 19 0826 **FOLLOWED BY** cloNIDine (CATAPRES) tablet 0.1 mg, 0.1 mg, Oral, BID PRN **FOLLOWED BY** [START ON 2019] cloNIDine (CATAPRES) tablet 0.1 mg, 0.1 mg, Oral, Daily PRN, Eliseo Escobedo MD  •  cyclobenzaprine (FLEXERIL) tablet 10 mg, 10 mg, Oral, TID PRN, Eliseo Escobedo MD, 10 mg at 19 08  •  dicyclomine (BENTYL) capsule 10 mg, 10 mg, Oral, TID PRN, Eliseo Escobedo MD, 10 mg at 19  •  famotidine (PEPCID) tablet 20 mg, 20 mg, Oral, BID PRN, Eliseo Escobedo MD  •  hydrOXYzine (ATARAX) tablet 50 mg, 50 mg, Oral, Q6H PRN, Eliseo Escobedo MD, 50 mg at 19  •  loperamide (IMODIUM) capsule 2 mg, 2 mg, Oral, Q2H PRN, Eliseo Escobedo MD, 2 mg at 19  •  magnesium hydroxide (MILK OF MAGNESIA) suspension 2400 mg/10mL 10 mL, 10 mL, Oral, Daily PRN, Eliseo Escobedo MD  •  nicotine (NICODERM CQ) 21 MG/24HR patch 1 patch, 1 patch, Transdermal, Q24H, Eliseo Escobedo MD, 1 patch at 19 0855  •  ondansetron (ZOFRAN) tablet 4 mg, 4 mg, Oral, Q6H PRN, Eliseo Escobedo MD, 4 mg at 19 0821  •  sodium chloride nasal spray 2 spray, 2 spray, Each Nare, PRN, Eliseo Escobedo MD  •  traZODone (DESYREL) tablet 50 mg, 50 mg, Oral, Nightly PRN, Eliseo Escobedo MD, 50 mg at 19    ASSESSMENT & PLAN:      Opioid dependence with withdrawal (CMS/[facility])  - Clonidine detox  - Subutex detox      Tobacco use disorder  - Encouraged patient to consider stopping tobacco use.      UTI  - Repeat UA improved, no need for abx.    Plan discharge tomorrow    Suicide precautions:  Suicide precuation Level 4 (q30 min checks)    Behavioral Health Treatment Plan and Problem List: I have reviewed and approved the Behavioral Health Treatment Plan and Problem list.  The patient has had a chance to review and agrees with the treatment plan.     Clinician:  Susan iHnds MD  11/23/19  12:51 PM

## 2019-11-23 NOTE — PLAN OF CARE
Problem: Patient Care Overview  Goal: Plan of Care Review  Outcome: Ongoing (interventions implemented as appropriate)   11/23/19 0952   Coping/Psychosocial   Plan of Care Reviewed With patient   Plan of Care Review   Progress improving   OTHER   Outcome Summary Patient calm and cooperative. A&Ox3. Rates anxiety 5/10. Depression 4/10. Cravings 3/10. Wd's HA, nausea, HA, and tremors. Denies SI/HI/CRUZ. Reports appetite and sleep are still poor. No other issue noted at this time. Will continue to monitor.   Coping/Psychosocial   Patient Agreement with Plan of Care agrees       Problem: Overarching Goals (Adult)  Goal: Adheres to Safety Considerations for Self and Others  Outcome: Ongoing (interventions implemented as appropriate)    Goal: Optimized Coping Skills in Response to Life Stressors  Outcome: Ongoing (interventions implemented as appropriate)    Goal: Develops/Participates in Therapeutic [place] to Support Successful Transition  Outcome: Ongoing (interventions implemented as appropriate)

## 2019-11-23 NOTE — PLAN OF CARE
Problem: Patient Care Overview  Goal: Plan of Care Review  Outcome: Ongoing (interventions implemented as appropriate)   11/23/19 0207   Coping/Psychosocial   Plan of Care Reviewed With patient   Plan of Care Review   Progress improving   OTHER   Outcome Summary Patient is alert and oriented x3. Anxiety rated 7/10, depression 5/10. Reports cravings 8/10. COW score 9 Reports w/d sx of diarrhea, nausea, muscle spasms and joint pain. Reports appetite is good. States he had difficulty staying asleep last night. No further complaints voiced this shift.   Coping/Psychosocial   Patient Agreement with Plan of Care agrees       Problem: Overarching Goals (Adult)  Goal: Adheres to Safety Considerations for Self and Others  Outcome: Ongoing (interventions implemented as appropriate)    Goal: Optimized Coping Skills in Response to Life Stressors  Outcome: Ongoing (interventions implemented as appropriate)    Goal: Develops/Participates in Therapeutic [place] to Support Successful Transition  Outcome: Ongoing (interventions implemented as appropriate)

## 2019-11-24 VITALS
SYSTOLIC BLOOD PRESSURE: 125 MMHG | DIASTOLIC BLOOD PRESSURE: 72 MMHG | TEMPERATURE: 98.2 F | BODY MASS INDEX: 20.62 KG/M2 | OXYGEN SATURATION: 99 % | HEIGHT: 75 IN | RESPIRATION RATE: 18 BRPM | WEIGHT: 165.8 LBS | HEART RATE: 80 BPM

## 2019-11-24 PROCEDURE — 99238 HOSP IP/OBS DSCHRG MGMT 30/<: CPT | Performed by: PSYCHIATRY & NEUROLOGY

## 2019-11-24 RX ADMIN — ACETAMINOPHEN 650 MG: 325 TABLET ORAL at 07:14

## 2019-11-24 RX ADMIN — ACETAMINOPHEN 650 MG: 325 TABLET ORAL at 13:41

## 2019-11-24 NOTE — DISCHARGE SUMMARY
"PSYCHIATRIC DISCHARGE SUMMARY     Patient Identification:  Name:  Stephon Jauregui  Age:  29 y.o.  Sex:  male  :  1990  MRN:  [id number]  Visit Number:  [id number]      Date of Admission:2019   Date of Discharge:  2019    Discharge Diagnosis:  Active Problems:    Opioid dependence with withdrawal (CMS/HCC)    Tobacco use disorder        Admission Diagnosis:  Opiate dependence (CMS/[facility]) [F11.20]     Hospital Course  Patient is a 29 y.o. male presented with opioid use and withdrawals. The patient was admitted to the [facility] [place]note 2[facility] detox recovery unit for safety, further evaluation and treatment.  The patient was started on Clonidine and Subutex detox regimen and he was able to complete the detox without any complications.  The patient was also able to take part in individual and group counseling sessions and work on appropriate coping skills. His plan was to go to [facility] for rehab treatment.  The patient made steady improvement in his mood and expressed feeling more positive and hopeful about future. Sleep and appetite were improved.  The day of discharge the patient was calm, cooperative and pleasant. Mood was reported to be good, and denied SI/HI/AVH. Also reported no medication side effects.        Mental Status Exam upon discharge:   Mood \"good\"   Affect-congruent, appropriate, stable  Thought Content-goal directed, no delusional material present  Thought process-linear, organized.  Suicidality: No SI  Homicidality: No HI  Perception: No AH/VH    Procedures Performed         Consults:   Consults     No orders found from 10/22/2019 to 2019.          Pertinent Test Results:   Lab Results (last 7 days)     Procedure Component Value Units Date/Time    Urinalysis With Microscopic - Urine, Clean Catch [[id number]] Collected:  19 1520    Specimen:  Urine, Clean Catch Updated:  19 1600    Narrative:       The following orders were created for panel order Urinalysis With " Microscopic - Urine, Clean Catch.  Procedure                               Abnormality         Status                     ---------                               -----------         ------                     Urinalysis without micro...[[id number]]  Abnormal            Final result               Urinalysis, Microscopic ...[[id number]]  Abnormal            Final result                 Please view results for these tests on the individual orders.    Urinalysis, Microscopic Only - Urine, Clean Catch [[id number]]  (Abnormal) Collected:  11/22/19 1520    Specimen:  Urine, Clean Catch Updated:  11/22/19 1600     RBC, UA 0-2 /HPF      WBC, UA 3-5 /HPF      Bacteria, UA None Seen /HPF      Squamous Epithelial Cells, UA 0-2 /HPF      Hyaline Casts, UA None Seen /LPF      Methodology Automated Microscopy    Urinalysis without microscopic (no culture) - Urine, Clean Catch [[id number]]  (Abnormal) Collected:  11/22/19 1520    Specimen:  Urine, Clean Catch Updated:  11/22/19 1558     Color, UA Yellow     Appearance, UA Clear     pH, UA 7.0     Specific Gravity, UA 1.010     Glucose, UA Negative     Ketones, UA Negative     Bilirubin, UA Negative     Blood, UA Negative     Protein, UA Negative     Leuk Esterase, UA Trace     Nitrite, UA Negative     Urobilinogen, UA 0.2 E.U./dL          Condition on Discharge:  improved    Vital Signs  Temp:  [97 °F (36.1 °C)-98.1 °F (36.7 °C)] 97.4 °F (36.3 °C)  Heart Rate:  [65-80] 65  Resp:  [18] 18  BP: ()/(55-99) 108/57      Discharge Disposition:  Home or Self Care    Discharge Medications:     Discharge Medications      Patient Not Prescribed Medications Upon Discharge         Discharge Diet: Regular    Activity at Discharge: As tolerated    Follow-up Appointments    [place]    Test Results Pending at Discharge      Clinician:   Susan Hinds MD  11/24/19  1:14 PM

## 2019-11-24 NOTE — PLAN OF CARE
Problem: Patient Care Overview  Goal: Plan of Care Review  Outcome: Ongoing (interventions implemented as appropriate)   11/24/19 0204   Coping/Psychosocial   Plan of Care Reviewed With patient   Plan of Care Review   Progress improving   OTHER   Outcome Summary Pt alert and oriented x3. Anxiety rated 4/10, depression 3/10. Cravings rated 5/10.Appetite is improved. Pt states he feels he is ready for discharge, however, is the reason for his remaining anxiety.    Coping/Psychosocial   Patient Agreement with Plan of Care agrees       Problem: Overarching Goals (Adult)  Goal: Adheres to Safety Considerations for Self and Others  Outcome: Ongoing (interventions implemented as appropriate)    Goal: Optimized Coping Skills in Response to Life Stressors  Outcome: Ongoing (interventions implemented as appropriate)    Goal: Develops/Participates in Therapeutic [place] to Support Successful Transition  Outcome: Ongoing (interventions implemented as appropriate)

## 2019-11-24 NOTE — DISCHARGE INSTR - APPOINTMENTS
You are scheduled to follow up with     [facility]  [address] [place] [address]    Facility staff will pick the patient up on Sunday 11-24-19

## 2020-06-20 ENCOUNTER — HOSPITAL ENCOUNTER (EMERGENCY)
Facility: HOSPITAL | Age: 30
Discharge: HOME | End: 2020-06-20
Attending: FAMILY MEDICINE | Admitting: FAMILY MEDICINE

## 2020-06-20 VITALS
DIASTOLIC BLOOD PRESSURE: 67 MMHG | WEIGHT: 165 LBS | BODY MASS INDEX: 20.51 KG/M2 | TEMPERATURE: 98 F | RESPIRATION RATE: 20 BRPM | SYSTOLIC BLOOD PRESSURE: 117 MMHG | HEART RATE: 64 BPM | HEIGHT: 75 IN | OXYGEN SATURATION: 99 %

## 2020-06-20 DIAGNOSIS — F11.10 HEROIN ABUSE (HCC): Primary | ICD-10-CM

## 2020-06-20 DIAGNOSIS — N39.0 ACUTE LOWER UTI: ICD-10-CM

## 2020-06-20 LAB
6-ACETYL MORPHINE: NEGATIVE
ALBUMIN SERPL-MCNC: 4.99 G/DL (ref 3.5–5.2)
ALBUMIN/GLOB SERPL: 1.9 G/DL
ALP SERPL-CCNC: 69 U/L (ref 39–117)
ALT SERPL W P-5'-P-CCNC: 13 U/L (ref 1–41)
AMPHET+METHAMPHET UR QL: NEGATIVE
ANION GAP SERPL CALCULATED.3IONS-SCNC: 15 MMOL/L (ref 5–15)
AST SERPL-CCNC: 12 U/L (ref 1–40)
BACTERIA UR QL AUTO: ABNORMAL /HPF
BARBITURATES UR QL SCN: NEGATIVE
BASOPHILS # BLD AUTO: 0.05 10*3/MM3 (ref 0–0.2)
BASOPHILS NFR BLD AUTO: 0.3 % (ref 0–1.5)
BENZODIAZ UR QL SCN: NEGATIVE
BILIRUB SERPL-MCNC: 0.4 MG/DL (ref 0.2–1.2)
BILIRUB UR QL STRIP: ABNORMAL
BUN BLD-MCNC: 18 MG/DL (ref 6–20)
BUN/CREAT SERPL: 18.8 (ref 7–25)
BUPRENORPHINE SERPL-MCNC: NEGATIVE NG/ML
CALCIUM SPEC-SCNC: 10 MG/DL (ref 8.6–10.5)
CANNABINOIDS SERPL QL: NEGATIVE
CHLORIDE SERPL-SCNC: 101 MMOL/L (ref 98–107)
CLARITY UR: ABNORMAL
CO2 SERPL-SCNC: 22 MMOL/L (ref 22–29)
COCAINE UR QL: NEGATIVE
COLOR UR: ABNORMAL
CREAT BLD-MCNC: 0.96 MG/DL (ref 0.76–1.27)
DEPRECATED RDW RBC AUTO: 43.1 FL (ref 37–54)
EOSINOPHIL # BLD AUTO: 0 10*3/MM3 (ref 0–0.4)
EOSINOPHIL NFR BLD AUTO: 0 % (ref 0.3–6.2)
ERYTHROCYTE [DISTWIDTH] IN BLOOD BY AUTOMATED COUNT: 12.6 % (ref 12.3–15.4)
ETHANOL BLD-MCNC: <10 MG/DL (ref 0–10)
ETHANOL UR QL: <0.01 %
GFR SERPL CREATININE-BSD FRML MDRD: 93 ML/MIN/1.73
GLOBULIN UR ELPH-MCNC: 2.6 GM/DL
GLUCOSE BLD-MCNC: 167 MG/DL (ref 65–99)
GLUCOSE UR STRIP-MCNC: NEGATIVE MG/DL
HCT VFR BLD AUTO: 46.3 % (ref 37.5–51)
HGB BLD-MCNC: 16 G/DL (ref 13–17.7)
HGB UR QL STRIP.AUTO: ABNORMAL
HYALINE CASTS UR QL AUTO: ABNORMAL /LPF
IMM GRANULOCYTES # BLD AUTO: 0.07 10*3/MM3 (ref 0–0.05)
IMM GRANULOCYTES NFR BLD AUTO: 0.4 % (ref 0–0.5)
KETONES UR QL STRIP: ABNORMAL
LEUKOCYTE ESTERASE UR QL STRIP.AUTO: ABNORMAL
LYMPHOCYTES # BLD AUTO: 2.28 10*3/MM3 (ref 0.7–3.1)
LYMPHOCYTES NFR BLD AUTO: 14.6 % (ref 19.6–45.3)
MAGNESIUM SERPL-MCNC: 2.2 MG/DL (ref 1.6–2.6)
MCH RBC QN AUTO: 31.9 PG (ref 26.6–33)
MCHC RBC AUTO-ENTMCNC: 34.6 G/DL (ref 31.5–35.7)
MCV RBC AUTO: 92.4 FL (ref 79–97)
METHADONE UR QL SCN: NEGATIVE
MONOCYTES # BLD AUTO: 0.97 10*3/MM3 (ref 0.1–0.9)
MONOCYTES NFR BLD AUTO: 6.2 % (ref 5–12)
MUCOUS THREADS URNS QL MICRO: ABNORMAL /HPF
NEUTROPHILS # BLD AUTO: 12.21 10*3/MM3 (ref 1.7–7)
NEUTROPHILS NFR BLD AUTO: 78.5 % (ref 42.7–76)
NITRITE UR QL STRIP: POSITIVE
NRBC BLD AUTO-RTO: 0 /100 WBC (ref 0–0.2)
OPIATES UR QL: POSITIVE
OXYCODONE UR QL SCN: NEGATIVE
PCP UR QL SCN: NEGATIVE
PH UR STRIP.AUTO: 6 [PH] (ref 5–8)
PLATELET # BLD AUTO: 190 10*3/MM3 (ref 140–450)
PMV BLD AUTO: 11.6 FL (ref 6–12)
POTASSIUM BLD-SCNC: 3.3 MMOL/L (ref 3.5–5.2)
PROT SERPL-MCNC: 7.6 G/DL (ref 6–8.5)
PROT UR QL STRIP: ABNORMAL
RBC # BLD AUTO: 5.01 10*6/MM3 (ref 4.14–5.8)
RBC # UR: ABNORMAL /HPF
REF LAB TEST METHOD: ABNORMAL
SODIUM BLD-SCNC: 138 MMOL/L (ref 136–145)
SP GR UR STRIP: >1.03 (ref 1–1.03)
SQUAMOUS #/AREA URNS HPF: ABNORMAL /HPF
UROBILINOGEN UR QL STRIP: ABNORMAL
WBC NRBC COR # BLD: 15.58 10*3/MM3 (ref 3.4–10.8)
WBC UR QL AUTO: ABNORMAL /HPF

## 2020-06-20 PROCEDURE — 80307 DRUG TEST PRSMV CHEM ANLYZR: CPT | Performed by: EMERGENCY MEDICINE

## 2020-06-20 PROCEDURE — 83735 ASSAY OF MAGNESIUM: CPT | Performed by: EMERGENCY MEDICINE

## 2020-06-20 PROCEDURE — 81001 URINALYSIS AUTO W/SCOPE: CPT | Performed by: EMERGENCY MEDICINE

## 2020-06-20 PROCEDURE — 63710000001 ONDANSETRON ODT 4 MG TABLET DISPERSIBLE: Performed by: PHYSICIAN ASSISTANT

## 2020-06-20 PROCEDURE — 80053 COMPREHEN METABOLIC PANEL: CPT | Performed by: EMERGENCY MEDICINE

## 2020-06-20 PROCEDURE — 99284 EMERGENCY DEPT VISIT MOD MDM: CPT

## 2020-06-20 PROCEDURE — 85025 COMPLETE CBC W/AUTO DIFF WBC: CPT | Performed by: EMERGENCY MEDICINE

## 2020-06-20 RX ORDER — POTASSIUM CHLORIDE 1.5 G/1.77G
20 POWDER, FOR SOLUTION ORAL ONCE
Status: COMPLETED | OUTPATIENT
Start: 2020-06-20 | End: 2020-06-20

## 2020-06-20 RX ORDER — CEPHALEXIN 250 MG/1
500 CAPSULE ORAL ONCE
Status: COMPLETED | OUTPATIENT
Start: 2020-06-20 | End: 2020-06-20

## 2020-06-20 RX ORDER — CEPHALEXIN 500 MG/1
500 CAPSULE ORAL 2 TIMES DAILY
Qty: 14 CAPSULE | Refills: 0 | Status: SHIPPED | OUTPATIENT
Start: 2020-06-20

## 2020-06-20 RX ORDER — ONDANSETRON 4 MG/1
4 TABLET, ORALLY DISINTEGRATING ORAL ONCE
Status: COMPLETED | OUTPATIENT
Start: 2020-06-20 | End: 2020-06-20

## 2020-06-20 RX ADMIN — CEPHALEXIN 500 MG: 250 CAPSULE ORAL at 13:40

## 2020-06-20 RX ADMIN — POTASSIUM CHLORIDE 20 MEQ: 1.5 POWDER, FOR SOLUTION ORAL at 13:41

## 2020-06-20 RX ADMIN — ONDANSETRON 4 MG: 4 TABLET, ORALLY DISINTEGRATING ORAL at 13:19

## 2020-06-20 NOTE — NURSING NOTE
Awaiting labs. Patient reports that he is nauseated and ran to the bathroom. He reports that he just threw up stomach acid. ED provider made aware

## 2020-06-20 NOTE — ED PROVIDER NOTES
Subjective   30-year-old male that presents to the emergency department requesting drug detox.  Patient states that he has been abusing heroin for the past several days.  States last use 1 day ago.  Patient denies any associated diabetes, hypertension, cardiac disease.      History provided by:  Patient   used: No    Drug / Alcohol Assessment   Primary symptoms include intoxication.  Primary symptoms include no confusion, no agitation, no hallucinations. This is a new problem. Suspected agents include heroin. Pertinent negatives include no fever, no nausea and no vomiting. Associated medical issues include addiction treatment.       Review of Systems   Constitutional: Negative.  Negative for fever.   Eyes: Negative.    Respiratory: Negative.  Negative for apnea, cough, choking and chest tightness.    Cardiovascular: Negative.  Negative for chest pain and leg swelling.   Gastrointestinal: Negative.  Negative for abdominal distention, abdominal pain, constipation, nausea and vomiting.   Genitourinary: Negative.  Negative for difficulty urinating, dysuria, enuresis, flank pain, genital sores and hematuria.   Musculoskeletal: Negative.  Negative for arthralgias, gait problem and myalgias.   Skin: Negative.  Negative for color change, pallor and wound.   Hematological: Negative.  Negative for adenopathy. Does not bruise/bleed easily.   Psychiatric/Behavioral: Negative.  Negative for agitation, confusion, decreased concentration, dysphoric mood and hallucinations. The patient is not nervous/anxious and is not hyperactive.    All other systems reviewed and are negative.      Past Medical History:   Diagnosis Date   • Substance abuse (CMS/HCC)    • Withdrawal symptoms, drug or narcotic (CMS/[facility])        Allergies   Allergen Reactions   • Nsaids Anaphylaxis   • Latex Rash       Past Surgical History:   Procedure Laterality Date   • ADENOIDECTOMY     • APPENDECTOMY     • BONE GRAFT     • CLEFT LIP REPAIR      • CLEFT PALATE REPAIR     • EAR TUBES     • TONSILLECTOMY         Family History   Problem Relation Age of Onset   • Alcohol abuse Mother    • Anxiety disorder Mother    • Depression Mother    • Suicide Attempts Cousin    • Depression Cousin    • Alcohol abuse Sister    • Anxiety disorder Sister    • Depression Sister        Social History     Socioeconomic History   • Marital status:      Spouse name: Not on file   • Number of children: Not on file   • Years of education: Not on file   • Highest education level: Not on file   Tobacco Use   • Smoking status: Current Every Day Smoker     Packs/day: 1.00     Types: Cigarettes   • Smokeless tobacco: Never Used   • Tobacco comment: smoking since 16 years old   Substance and Sexual Activity   • Alcohol use: No     Frequency: Never     Comment: occ   • Drug use: Yes     Types: Oxycodone, Heroin, Hydrocodone   • Sexual activity: Yes     Partners: Female   Social History Narrative    ** Merged History Encounter **                Objective   Physical Exam   Constitutional: He is oriented to person, place, and time. He appears well-developed and well-nourished. No distress.   HENT:   Head: Normocephalic and atraumatic.   Right Ear: External ear normal.   Left Ear: External ear normal.   Nose: Nose normal.   Mouth/Throat: Oropharynx is clear and moist. No oropharyngeal exudate.   Eyes: Pupils are equal, round, and reactive to light. Conjunctivae and EOM are normal. Right eye exhibits no discharge. Left eye exhibits no discharge. No scleral icterus.   Neck: Normal range of motion. Neck supple. No tracheal deviation present. No thyromegaly present.   Cardiovascular: Normal rate, regular rhythm, normal heart sounds and intact distal pulses. Exam reveals no gallop and no friction rub.   No murmur heard.  Pulmonary/Chest: Effort normal and breath sounds normal. No stridor. No respiratory distress. He has no wheezes. He has no rales.   Abdominal: Soft. Bowel sounds are  normal. He exhibits no distension and no mass. There is no tenderness. There is no rebound and no guarding.   Musculoskeletal: Normal range of motion. He exhibits no edema, tenderness or deformity.   Lymphadenopathy:     He has no cervical adenopathy.   Neurological: He is alert and oriented to person, place, and time. He displays normal reflexes. No cranial nerve deficit or sensory deficit. He exhibits normal muscle tone. Coordination normal.   Skin: Skin is warm and dry. Capillary refill takes less than 2 seconds. No rash noted. He is not diaphoretic. No erythema. No pallor.   Psychiatric: His behavior is normal. Judgment and thought content normal.   Nursing note and vitals reviewed.      Procedures           ED Course  ED Course as of Jun 20 1415   Sat Jun 20, 2020   1400 Discussed care with patient and intake. Patient does state that he cannot restrain from taking heroin. Have no beds at this facility. Dr. Valentine to come and see patient face to face for discharge.     [BH]   1411 Dr. Valentine and Dr. Hurtado discussed care. Patient will be discharged per Dr. Valentine. Discussed option of transfer, intake stated to have patient return in a.m. As beds could possibly open up.      [BH]      ED Course User Index  [BH] Akhil Partida PA-C                                           [facility]    Final diagnoses:   Heroin abuse (CMS/[facility])   Acute lower UTI            Akhil Partida PA-C  06/20/20 1415

## 2020-06-20 NOTE — NURSING NOTE
Notified by Dr. Valentine that he called and spoke to the attending in the ED and this patient is to be DC home. As planned before, that patient may return in the am or go to his local hospital if he gets feeling worse and  Also that he may want to check for bed availability before traveling the two and a half hours he drove before having his family drop him off. Instructed to DC. Instructed patient of plan of care. And that ED provider reports that he cannot dispense suboxone or subutex. ED provider instructed pt to come back later if he gets feeling worse. Unable to give bed status for tomorrow as of yet. Patient made aware.

## 2020-06-20 NOTE — NURSING NOTE
Called and reviewed intake information with Dr. Valentine. Instructed that with the patients cows score, he does not meet admission criteria quite yet anyway. Instructed to suggest he come back if he gets worse, otherwise no beds are available. He is welcome to try again another day. DC with detox referral packet. [id number].

## 2020-06-20 NOTE — NURSING NOTE
Per Gennaro Partida, he feels this patient is at high risk and if intake is not willing to attempt to send him anywhere else then he is going to insist the on call  Psychiatrist come see the patient before he discharges him. Instructed that he is unable to dispense subutex to the patient and feels that his use of heroin puts him at high risk. And feels that he should be sent out somewhere else rather than dc the patient. Informed him that at this time there are no beds, his scores are low, and intake does not generally TF detox patients due to no beds. If patient is unstable medically then ED can treat. Instructed Dr. Valentine via phone and he reported he will come see the patient soon as possible.

## 2020-06-20 NOTE — NURSING NOTE
Patient presents to intake today requesting to go to the detox unit. The patient states that he had been clean for several months but relapsed in march and has been using heroin iv daily. Up to a gm or more since he relapsed. He states he and his wife got into it and he just had a lot going on and he relapsed. He states that he had been using pretty heavy but is ready now to get back into treatment and is tired of going back and forth with his addiction. At this time he is not voicing any etoh or other drug use. He denies any SI HI or AVH. COWS score 8. Patient reports that sometime last night he did about a half a gm of heroin.

## 2020-06-20 NOTE — NURSING NOTE
Warm blanket provided to patient. informated him that we are waiting on labs and for the ED provider to see him. Patient reports I sure hope they keep me I drove 2.5 hours away and did not have anyone stay. Explained to patient that the decision is based on need not convenience. Pt verbalized understanding.

## 2020-06-20 NOTE — DISCHARGE INSTRUCTIONS
Call one of the offices below to establish a primary care provider.  If you are unable to get an appointment and feel it is an emergency and need to be seen immediately please return to the Emergency Department.    Call one of the office below to set up a primary care provider.    Dr. Hiram Alvarado                                                                                                       [address] [facility] [address]  [phone]    Dr. Chu, Dr. AGA Shoemaker, Dr. DYAN Shoemaker ([facility])  [address] [facility] [address]  [phone]    Dr. José, Dr. Madera, Dr. Moses ([facility])  [address] [facility] [address]  [phone]    Dr. Oquendo  [address] [facility] [address]  [phone]    Dr. Dumont, Dr. Stevens, Dr. Ag, Dr. Dominguez ([facility])  [address] [place] [address] [address]  [facility] [address]  [phone]    Dr. Mitzy Burton  [address] [facility] [place] [address]  [phone]    Dr. Kay Wilkes  [address]   [address]  [facility] [address]  [phone]    Dr. Alvarado  [address] [facility] [address]  [phone]    Dr. Watson, Dr. Simon  [address] [facility] [place] [address]  [phone]    Dr. Bryan  [address]                                                              [address]  [facility] [address]  [phone]    Dr. Rodríguez  [address] [facility] [address]  [phone]    Dr. Macey Villela  [address]  [address]  [place] [address]  [phone]    Dr. Milligan and [facility]   [address] [facility]  [address]  [place] [address]  [phone]